# Patient Record
Sex: FEMALE | Race: WHITE | NOT HISPANIC OR LATINO
[De-identification: names, ages, dates, MRNs, and addresses within clinical notes are randomized per-mention and may not be internally consistent; named-entity substitution may affect disease eponyms.]

---

## 2018-01-05 ENCOUNTER — APPOINTMENT (OUTPATIENT)
Dept: GASTROENTEROLOGY | Facility: CLINIC | Age: 58
End: 2018-01-05
Payer: COMMERCIAL

## 2018-01-05 VITALS
RESPIRATION RATE: 12 BRPM | HEART RATE: 73 BPM | HEIGHT: 59 IN | WEIGHT: 116 LBS | DIASTOLIC BLOOD PRESSURE: 64 MMHG | OXYGEN SATURATION: 99 % | BODY MASS INDEX: 23.39 KG/M2 | TEMPERATURE: 97.9 F | SYSTOLIC BLOOD PRESSURE: 102 MMHG

## 2018-01-05 PROCEDURE — 36415 COLL VENOUS BLD VENIPUNCTURE: CPT

## 2018-01-05 PROCEDURE — 99214 OFFICE O/P EST MOD 30 MIN: CPT | Mod: 25

## 2018-01-05 RX ORDER — OMEPRAZOLE 40 MG/1
40 CAPSULE, DELAYED RELEASE ORAL
Qty: 30 | Refills: 0 | Status: ACTIVE | COMMUNITY
Start: 2017-12-28

## 2018-01-07 LAB
ALBUMIN SERPL ELPH-MCNC: 4.2 G/DL
ALP BLD-CCNC: 119 U/L
ALT SERPL-CCNC: 23 U/L
AMYLASE/CREAT SERPL: 103 U/L
APPEARANCE: CLEAR
AST SERPL-CCNC: 27 U/L
BASOPHILS # BLD AUTO: 0.02 K/UL
BASOPHILS NFR BLD AUTO: 0.4 %
BILIRUB DIRECT SERPL-MCNC: 0.1 MG/DL
BILIRUB INDIRECT SERPL-MCNC: 0.1 MG/DL
BILIRUB SERPL-MCNC: 0.2 MG/DL
BILIRUBIN URINE: NEGATIVE
BLOOD URINE: NEGATIVE
COLOR: YELLOW
CREAT SERPL-MCNC: 0.87 MG/DL
EOSINOPHIL # BLD AUTO: 0.04 K/UL
EOSINOPHIL NFR BLD AUTO: 0.9 %
GLUCOSE QUALITATIVE U: NEGATIVE MG/DL
HCT VFR BLD CALC: 39.1 %
HGB BLD-MCNC: 13.3 G/DL
IMM GRANULOCYTES NFR BLD AUTO: 0.2 %
KETONES URINE: NEGATIVE
LEUKOCYTE ESTERASE URINE: NEGATIVE
LPL SERPL-CCNC: 40 U/L
LYMPHOCYTES # BLD AUTO: 1.73 K/UL
LYMPHOCYTES NFR BLD AUTO: 38.4 %
MAN DIFF?: NORMAL
MCHC RBC-ENTMCNC: 30 PG
MCHC RBC-ENTMCNC: 34 GM/DL
MCV RBC AUTO: 88.3 FL
MONOCYTES # BLD AUTO: 0.39 K/UL
MONOCYTES NFR BLD AUTO: 8.6 %
NEUTROPHILS # BLD AUTO: 2.32 K/UL
NEUTROPHILS NFR BLD AUTO: 51.5 %
NITRITE URINE: NEGATIVE
PH URINE: 5.5
PLATELET # BLD AUTO: 224 K/UL
PROT SERPL-MCNC: 7 G/DL
PROTEIN URINE: NEGATIVE MG/DL
RBC # BLD: 4.43 M/UL
RBC # FLD: 12 %
SPECIFIC GRAVITY URINE: 1.01
UROBILINOGEN URINE: NEGATIVE MG/DL
WBC # FLD AUTO: 4.51 K/UL

## 2018-01-24 ENCOUNTER — MESSAGE (OUTPATIENT)
Age: 58
End: 2018-01-24

## 2018-02-04 ENCOUNTER — OTHER (OUTPATIENT)
Age: 58
End: 2018-02-04

## 2018-02-04 RX ORDER — OMEPRAZOLE 20 MG/1
20 CAPSULE, DELAYED RELEASE ORAL
Qty: 30 | Refills: 2 | Status: ACTIVE | COMMUNITY
Start: 2018-02-04 | End: 1900-01-01

## 2018-08-02 ENCOUNTER — OUTPATIENT (OUTPATIENT)
Dept: OUTPATIENT SERVICES | Facility: HOSPITAL | Age: 58
LOS: 1 days | End: 2018-08-02
Payer: COMMERCIAL

## 2018-08-02 ENCOUNTER — APPOINTMENT (OUTPATIENT)
Dept: ULTRASOUND IMAGING | Facility: IMAGING CENTER | Age: 58
End: 2018-08-02
Payer: COMMERCIAL

## 2018-08-02 DIAGNOSIS — R10.11 RIGHT UPPER QUADRANT PAIN: ICD-10-CM

## 2018-08-02 PROCEDURE — 76536 US EXAM OF HEAD AND NECK: CPT | Mod: 26

## 2018-08-02 PROCEDURE — 76536 US EXAM OF HEAD AND NECK: CPT

## 2018-09-17 ENCOUNTER — APPOINTMENT (OUTPATIENT)
Dept: ORTHOPEDIC SURGERY | Facility: CLINIC | Age: 58
End: 2018-09-17
Payer: COMMERCIAL

## 2018-09-17 VITALS — HEIGHT: 60 IN | WEIGHT: 113 LBS | BODY MASS INDEX: 22.19 KG/M2

## 2018-09-17 VITALS
HEIGHT: 59 IN | HEART RATE: 74 BPM | SYSTOLIC BLOOD PRESSURE: 102 MMHG | WEIGHT: 116 LBS | DIASTOLIC BLOOD PRESSURE: 61 MMHG | BODY MASS INDEX: 23.39 KG/M2

## 2018-09-17 DIAGNOSIS — M50.30 OTHER CERVICAL DISC DEGENERATION, UNSPECIFIED CERVICAL REGION: ICD-10-CM

## 2018-09-17 DIAGNOSIS — M54.12 RADICULOPATHY, CERVICAL REGION: ICD-10-CM

## 2018-09-17 DIAGNOSIS — M81.0 AGE-RELATED OSTEOPOROSIS W/OUT CURRENT PATHOLOGICAL FRACTURE: ICD-10-CM

## 2018-09-17 DIAGNOSIS — R12 HEARTBURN: ICD-10-CM

## 2018-09-17 DIAGNOSIS — M54.2 CERVICALGIA: ICD-10-CM

## 2018-09-17 DIAGNOSIS — M47.22 OTHER SPONDYLOSIS WITH RADICULOPATHY, CERVICAL REGION: ICD-10-CM

## 2018-09-17 PROCEDURE — 99204 OFFICE O/P NEW MOD 45 MIN: CPT

## 2018-09-17 RX ORDER — RALOXIFENE HYDROCHLORIDE 60 MG/1
TABLET ORAL
Refills: 0 | Status: ACTIVE | COMMUNITY

## 2018-09-17 RX ORDER — NAPROXEN 500 MG/1
500 TABLET ORAL
Qty: 60 | Refills: 0 | Status: ACTIVE | COMMUNITY
Start: 2018-09-17 | End: 1900-01-01

## 2018-10-17 ENCOUNTER — APPOINTMENT (OUTPATIENT)
Dept: ORTHOPEDIC SURGERY | Facility: CLINIC | Age: 58
End: 2018-10-17

## 2019-04-24 ENCOUNTER — APPOINTMENT (OUTPATIENT)
Dept: OPHTHALMOLOGY | Facility: CLINIC | Age: 59
End: 2019-04-24
Payer: COMMERCIAL

## 2019-04-24 DIAGNOSIS — H57.11 OCULAR PAIN, RIGHT EYE: ICD-10-CM

## 2019-04-24 PROCEDURE — 92004 COMPRE OPH EXAM NEW PT 1/>: CPT

## 2019-04-25 ENCOUNTER — APPOINTMENT (OUTPATIENT)
Dept: OPHTHALMOLOGY | Facility: CLINIC | Age: 59
End: 2019-04-25
Payer: COMMERCIAL

## 2019-04-25 PROBLEM — H57.11 PAIN AROUND RIGHT EYE: Status: ACTIVE | Noted: 2019-04-25

## 2019-04-25 PROCEDURE — 92012 INTRM OPH EXAM EST PATIENT: CPT

## 2019-06-07 ENCOUNTER — APPOINTMENT (OUTPATIENT)
Dept: GASTROENTEROLOGY | Facility: CLINIC | Age: 59
End: 2019-06-07
Payer: COMMERCIAL

## 2019-06-07 VITALS
BODY MASS INDEX: 22.19 KG/M2 | OXYGEN SATURATION: 99 % | DIASTOLIC BLOOD PRESSURE: 60 MMHG | SYSTOLIC BLOOD PRESSURE: 100 MMHG | RESPIRATION RATE: 14 BRPM | HEART RATE: 69 BPM | HEIGHT: 60 IN | TEMPERATURE: 98.1 F | WEIGHT: 113 LBS

## 2019-06-07 DIAGNOSIS — R10.9 UNSPECIFIED ABDOMINAL PAIN: ICD-10-CM

## 2019-06-07 PROCEDURE — 99214 OFFICE O/P EST MOD 30 MIN: CPT

## 2019-06-07 RX ORDER — HYOSCYAMINE SULFATE 0.12 MG/1
0.12 TABLET SUBLINGUAL 4 TIMES DAILY
Qty: 30 | Refills: 1 | Status: ACTIVE | COMMUNITY
Start: 2019-06-07 | End: 1900-01-01

## 2019-06-07 NOTE — ASSESSMENT
[FreeTextEntry1] : Impression:\par \par #1 abdominal pain-primarily right upper quadrant pain. Exacerbated by food intake. Similar to previously reported pain  at which time routine labs, CT scan and EGD were nondiagnostic. Doubt peptic ulcer disease. No response to previous PPI therapy. Dominant location it right costal margin from suspicion of musculoskeletal etiology. However, not clear why symptoms are exacerbated postprandial period\par \par #2 iregular bowel movements-very sensitive with intermittent loose stools.  Lactose intolerance as well as other possible dietary sensitivity suspected.\par \par #3 colon cancer screening-reports normal colonoscopy 2013. Average risk.\par \par #4 status post cholecystectomy.\par \par #5 hepatic hemangioma.\par \par #6 alopecia.\par

## 2019-06-07 NOTE — HISTORY OF PRESENT ILLNESS
[FreeTextEntry1] : Office revisit on 6/7/19.\par \par Patient presents for GI followup-continued complaints of right upper quadrant abdominal pain. Irregular bowel movements.\par \par INITIAL CONSULTATION NOTE:\par \par Office revisit on 6/24/15. The patient is a 54-year-old woman with a complaint of right upper quadrant pain. The patient was recently evaluated by Dr. Afshin Del Valle. The patient requests followup by me as her  and other family members are my patients.\par \par The patient has multiple somatic complaints. Salient history:\par \par -Constant pain localized to right costal margin region x 3months. Described as a dull, heaviness and pressure discomfort but also can be sharp. Severity increased postprandial. Feels pain is worse immediately upon eating and implicates no particular foods are provocative. At times the pain can radiate around the flank to her back or occasionally to the right side of the abdomen.\par \par -Complaints of heartburn. Typical retrosternal burning. Recently started on pantoprazole 40 mg daily with improvement in heartburn. Right upper quadrant discomfort is no better.\par \par -Burning discomfort of tongue. No better on pantoprazole.\par \par -Dysuria of several weeks duration.\par \par -Complains of insomnia. Difficulty sleeping because of discomfort.\par \par -Complains of dizziness.\par \par Patient relates onset of various current symptoms to a dental implant was inserted approximately 3 months ago. Was on antibiotics for approximately one month at that time.\par \par Stable appetite. Has lost 4 pounds. Denies dysphagia, regurgitation, nausea or vomiting. Previously constipated. Now has regular daily bowel movements without complaints of diarrhea or any rectal bleeding.\par \par A CT scan and abdominal ultrasound was reported as nondiagnostic. Patient was told of hepatic steatosis.\par \par She underwent a laparoscopic cholecystectomy approximately 15 years ago.\par \par EGD 8/10/15:    -The patient underwent an upper endoscopy on 8/10/15. The esophagus was normal. Examination of the stomach revealed prepyloric antral erythema. The gastric examination was otherwise normal. The visualized duodenum to the second portion was normal. Duodenal biopsies were normal without any features of celiac disease. Gastric biopsy revealed chronic inactive gastritis and reactive gastropathy. Testing was negative for Helicobacter pylori. \par \par ORV 1/5/18;    -Patient presents with complaint of epigastric and right upper quadrant pain. Current complaint similar to previous symptoms reported in 2015 but patient indicates pain is more severe at this time. Previous symptoms in 2015 were of unexplained etiology and ultimately resolved. However, patient indicates unexplained recurrence one month ago with increased severity over the past 2 weeks. No precipitating factors over this past month are reported such as diet change, new medications, travel etc. Has been under increased stress.\par             -Now experiencing a constant epigastric and right upper quadrant pain that increases in severity in association with eating. Patient indicates that increased severity of pain is almost immediate upon beginning the meal and that no particular food is provocative. Describes a sharp pain at the epigastric, right upper quadrant as well as right flank and back region in association with eating. The more intense pain will subside but there is a persistent constant dull pain at the site rated at 4/10. No relationship to exertion, lifting etc. Palliative factors not reported. The patient has been on omeprazole 40 mg b.i.d. without improvement. No associated rash.\par                 -Denies fever. Appetite fair. No significant weight change but may have lost one or 2 pounds. Denies dysphagia, nausea or vomiting. Can have occasional heartburn. Typically has a bowel movement every other day without complaints of diarrhea, constipation or rectal bleeding.\par                 -Patient had recent routine labs including CBC, liver enzymes and amylase which were all normal except for mildly elevated amylase.\par                  -Patient complains of burning sensation in her mouth as well as vagina.\par \par CT SCAN ABDOMEN 2/1/18:    -Review of the 2/1/18 CT scan of the abdomen with the patient. Examination of the liver was noted for an 11 mm enhancing nodule in the dome of the right hepatic lobe. Of note, an identical finding was detected at a previous CAT scan on 5/5/15 at which time an 11 mm enhancing right hepatic nodule was noted consistent with a hemangioma. Stable findings. CT scan otherwise normal including normal pancreas, kidneys and visualized bowel. No significant findings noted.\par \par ORV 6/7/19:    -Experiencing intermittent right upper quadrant abdominal pain. Mostly right upper quadrant and especially right costal margin region. Described as intermittent nonradiating sharp pain lasting one hour. Usually occurs post prandial with episodes 2-3 times per week. Feels dairy products exacerbate symptoms. Patient is status post cholecystectomy.\par                       -Experiencing a regular bowel movements for several months.. Indicates diarrhea after dairy products. Typically having 2-3 bowel movements daily which vary in consistency. Describes soft bowel movements or at times passage of "small pieces". No rectal bleeding. Previously was having formed bowel movement every other day. Precipitating factors at onset such as travel, diet change etc. not reported.\par                      -Appetite stable. Indicates gradual 7 pound weight decrease. Denies dysphagia. Only infrequently experiences heartburn. Denies nausea or vomiting.\par

## 2019-06-07 NOTE — CONSULT LETTER
[Please see my note below.] : Please see my note below. [Dear  ___] : Dear  [unfilled], [Sincerely,] : Sincerely, [Consult Closing:] : Thank you very much for allowing me to participate in the care of this patient.  If you have any questions, please do not hesitate to contact me. [Husam Banda M.D.] : Husam Banda M.D. [Division of Gastroenterology] : Division of Gastroenterology [[address] : [address] [[facility]] : [facility] [[place] [address]] : [place] [address] [FreeTextEntry3] : Husam Banda M.D. [FreeTextEntry2] : Dr. Esperanza Walls [Consult Letter:] : I had the pleasure of evaluating your patient, [unfilled].

## 2019-06-07 NOTE — PHYSICAL EXAM
[General Appearance - Alert] : alert [General Appearance - In No Acute Distress] : in no acute distress [General Appearance - Well Nourished] : well nourished [General Appearance - Well-Appearing] : healthy appearing [General Appearance - Well Developed] : well developed [Sclera] : the sclera and conjunctiva were normal [Neck Appearance] : the appearance of the neck was normal [Oropharynx] : the oropharynx was normal [Neck Cervical Mass (___cm)] : no neck mass was observed [Respiration, Rhythm And Depth] : normal respiratory rhythm and effort [Auscultation Breath Sounds / Voice Sounds] : lungs were clear to auscultation bilaterally [Exaggerated Use Of Accessory Muscles For Inspiration] : no accessory muscle use [Heart Rate And Rhythm] : heart rate was normal and rhythm regular [Heart Sounds Gallop] : no gallops [Murmurs] : no murmurs [Heart Sounds] : normal S1 and S2 [Edema] : there was no peripheral edema [Heart Sounds Pericardial Friction Rub] : no pericardial rub [Bowel Sounds] : normal bowel sounds [Abdomen Soft] : soft [] : no hepato-splenomegaly [Abdomen Hernia] : no hernia was discovered [Abdomen Mass (___ Cm)] : no abdominal mass palpated [Supraclavicular Lymph Nodes Enlarged Bilaterally] : supraclavicular [Cervical Lymph Nodes Enlarged Anterior Bilaterally] : anterior cervical [Cervical Lymph Nodes Enlarged Posterior Bilaterally] : posterior cervical [No CVA Tenderness] : no ~M costovertebral angle tenderness [Nail Clubbing] : no clubbing  or cyanosis of the fingernails [Abnormal Walk] : normal gait [Skin Color & Pigmentation] : normal skin color and pigmentation [Oriented To Time, Place, And Person] : oriented to person, place, and time [Affect] : the affect was normal [Mood] : the mood was normal [Impaired Insight] : insight and judgment were intact [FreeTextEntry1] : S1-S2 regular.

## 2019-08-27 ENCOUNTER — APPOINTMENT (OUTPATIENT)
Dept: GASTROENTEROLOGY | Facility: CLINIC | Age: 59
End: 2019-08-27

## 2021-01-26 ENCOUNTER — NON-APPOINTMENT (OUTPATIENT)
Age: 61
End: 2021-01-26

## 2021-01-26 ENCOUNTER — APPOINTMENT (OUTPATIENT)
Dept: OPHTHALMOLOGY | Facility: CLINIC | Age: 61
End: 2021-01-26
Payer: COMMERCIAL

## 2021-01-26 PROCEDURE — 92020 GONIOSCOPY: CPT

## 2021-01-26 PROCEDURE — 99072 ADDL SUPL MATRL&STAF TM PHE: CPT

## 2021-01-26 PROCEDURE — 92014 COMPRE OPH EXAM EST PT 1/>: CPT

## 2021-01-26 PROCEDURE — 92134 CPTRZ OPH DX IMG PST SGM RTA: CPT

## 2021-11-05 ENCOUNTER — TRANSCRIPTION ENCOUNTER (OUTPATIENT)
Age: 61
End: 2021-11-05

## 2022-02-23 ENCOUNTER — APPOINTMENT (OUTPATIENT)
Dept: GASTROENTEROLOGY | Facility: CLINIC | Age: 62
End: 2022-02-23
Payer: COMMERCIAL

## 2022-02-23 VITALS
DIASTOLIC BLOOD PRESSURE: 80 MMHG | HEART RATE: 82 BPM | TEMPERATURE: 98 F | SYSTOLIC BLOOD PRESSURE: 120 MMHG | WEIGHT: 117 LBS | HEIGHT: 60 IN | BODY MASS INDEX: 22.97 KG/M2 | OXYGEN SATURATION: 98 %

## 2022-02-23 DIAGNOSIS — R10.11 RIGHT UPPER QUADRANT PAIN: ICD-10-CM

## 2022-02-23 PROCEDURE — 99214 OFFICE O/P EST MOD 30 MIN: CPT

## 2022-02-23 RX ORDER — SODIUM PICOSULFATE, MAGNESIUM OXIDE, AND ANHYDROUS CITRIC ACID 10; 3.5; 12 MG/160ML; G/160ML; G/160ML
10-3.5-12 MG-GM LIQUID ORAL
Qty: 320 | Refills: 0 | Status: ACTIVE | COMMUNITY
Start: 2022-02-23 | End: 1900-01-01

## 2022-02-23 NOTE — CONSULT LETTER
[Dear  ___] : Dear  [unfilled], [Consult Letter:] : I had the pleasure of evaluating your patient, [unfilled]. [Please see my note below.] : Please see my note below. [Consult Closing:] : Thank you very much for allowing me to participate in the care of this patient.  If you have any questions, please do not hesitate to contact me. [Sincerely,] : Sincerely, [FreeTextEntry2] : Dr. Esperanza Walls [FreeTextEntry3] : Husam Banda M.D.

## 2022-02-23 NOTE — ASSESSMENT
[FreeTextEntry1] : Impression:\par \par #1 colon cancer screening–rule out colonic neoplasm.  Patient average risk.  Currently stable from GI standpoint without report of lower GI symptoms at current time.  Reports normal colonoscopy 2013.\par \par #2 episodic abdominal pain-previously reported right upper quadrant pain as well as epigastric pain.  Last episode 11/2021–resolved with omeprazole.  No current abdominal pain and not requiring antisecretory therapy at current time.  Had previous nondiagnostic CT scan and upper endoscopy.\par \par #3 lactose intolerance–dairy sensitive.  Occasional loose stools after dairy products..\par \par #4 status post cholecystectomy.\par \par #5 hepatic hemangioma.\par \par #6 history of alopecia.\par

## 2022-02-23 NOTE — PHYSICAL EXAM
[General Appearance - Alert] : alert [General Appearance - In No Acute Distress] : in no acute distress [General Appearance - Well Nourished] : well nourished [General Appearance - Well Developed] : well developed [General Appearance - Well-Appearing] : healthy appearing [Sclera] : the sclera and conjunctiva were normal [Neck Appearance] : the appearance of the neck was normal [Neck Cervical Mass (___cm)] : no neck mass was observed [Respiration, Rhythm And Depth] : normal respiratory rhythm and effort [Exaggerated Use Of Accessory Muscles For Inspiration] : no accessory muscle use [Auscultation Breath Sounds / Voice Sounds] : lungs were clear to auscultation bilaterally [Heart Rate And Rhythm] : heart rate was normal and rhythm regular [Heart Sounds] : normal S1 and S2 [Heart Sounds Gallop] : no gallops [Murmurs] : no murmurs [Heart Sounds Pericardial Friction Rub] : no pericardial rub [Edema] : there was no peripheral edema [Bowel Sounds] : normal bowel sounds [Abdomen Soft] : soft [Abdomen Tenderness] : non-tender [] : no hepato-splenomegaly [Abdomen Mass (___ Cm)] : no abdominal mass palpated [Abdomen Hernia] : no hernia was discovered [Cervical Lymph Nodes Enlarged Posterior Bilaterally] : posterior cervical [Cervical Lymph Nodes Enlarged Anterior Bilaterally] : anterior cervical [Supraclavicular Lymph Nodes Enlarged Bilaterally] : supraclavicular [No CVA Tenderness] : no ~M costovertebral angle tenderness [Abnormal Walk] : normal gait [Nail Clubbing] : no clubbing  or cyanosis of the fingernails [Skin Color & Pigmentation] : normal skin color and pigmentation [Oriented To Time, Place, And Person] : oriented to person, place, and time [Impaired Insight] : insight and judgment were intact [Affect] : the affect was normal [Mood] : the mood was normal [FreeTextEntry1] : S1-S2 regular.

## 2022-02-23 NOTE — HISTORY OF PRESENT ILLNESS
[FreeTextEntry1] : Office revisit on 2/23/2022.\par \par Patient presents for GI followup in preparation for screening colonoscopy.\par \par INITIAL CONSULTATION NOTE:\par \par Office revisit on 6/24/15. The patient is a 54-year-old woman with a complaint of right upper quadrant pain. The patient was recently evaluated by Dr. Afshin Del Valle. The patient requests followup by me as her  and other family members are my patients.\par \par The patient has multiple somatic complaints. Salient history:\par \par -Constant pain localized to right costal margin region x 3months. Described as a dull, heaviness and pressure discomfort but also can be sharp. Severity increased postprandial. Feels pain is worse immediately upon eating and implicates no particular foods are provocative. At times the pain can radiate around the flank to her back or occasionally to the right side of the abdomen.\par \par -Complaints of heartburn. Typical retrosternal burning. Recently started on pantoprazole 40 mg daily with improvement in heartburn. Right upper quadrant discomfort is no better.\par \par -Burning discomfort of tongue. No better on pantoprazole.\par \par -Dysuria of several weeks duration.\par \par -Complains of insomnia. Difficulty sleeping because of discomfort.\par \par -Complains of dizziness.\par \par Patient relates onset of various current symptoms to a dental implant was inserted approximately 3 months ago. Was on antibiotics for approximately one month at that time.\par \par Stable appetite. Has lost 4 pounds. Denies dysphagia, regurgitation, nausea or vomiting. Previously constipated. Now has regular daily bowel movements without complaints of diarrhea or any rectal bleeding.\par \par A CT scan and abdominal ultrasound was reported as nondiagnostic. Patient was told of hepatic steatosis.\par \par She underwent a laparoscopic cholecystectomy approximately 15 years ago.\par \par EGD 8/10/15:    -The patient underwent an upper endoscopy on 8/10/15. The esophagus was normal. Examination of the stomach revealed prepyloric antral erythema. The gastric examination was otherwise normal. The visualized duodenum to the second portion was normal. Duodenal biopsies were normal without any features of celiac disease. Gastric biopsy revealed chronic inactive gastritis and reactive gastropathy. Testing was negative for Helicobacter pylori. \par \par ORV 1/5/18;    -Patient presents with complaint of epigastric and right upper quadrant pain. Current complaint similar to previous symptoms reported in 2015 but patient indicates pain is more severe at this time. Previous symptoms in 2015 were of unexplained etiology and ultimately resolved. However, patient indicates unexplained recurrence one month ago with increased severity over the past 2 weeks. No precipitating factors over this past month are reported such as diet change, new medications, travel etc. Has been under increased stress.\par             -Now experiencing a constant epigastric and right upper quadrant pain that increases in severity in association with eating. Patient indicates that increased severity of pain is almost immediate upon beginning the meal and that no particular food is provocative. Describes a sharp pain at the epigastric, right upper quadrant as well as right flank and back region in association with eating. The more intense pain will subside but there is a persistent constant dull pain at the site rated at 4/10. No relationship to exertion, lifting etc. Palliative factors not reported. The patient has been on omeprazole 40 mg b.i.d. without improvement. No associated rash.\par                 -Denies fever. Appetite fair. No significant weight change but may have lost one or 2 pounds. Denies dysphagia, nausea or vomiting. Can have occasional heartburn. Typically has a bowel movement every other day without complaints of diarrhea, constipation or rectal bleeding.\par                 -Patient had recent routine labs including CBC, liver enzymes and amylase which were all normal except for mildly elevated amylase.\par                  -Patient complains of burning sensation in her mouth as well as vagina.\par \par CT SCAN ABDOMEN 2/1/18:    -Review of the 2/1/18 CT scan of the abdomen with the patient. Examination of the liver was noted for an 11 mm enhancing nodule in the dome of the right hepatic lobe. Of note, an identical finding was detected at a previous CAT scan on 5/5/15 at which time an 11 mm enhancing right hepatic nodule was noted consistent with a hemangioma. Stable findings. CT scan otherwise normal including normal pancreas, kidneys and visualized bowel. No significant findings noted.\par \par ORV 6/7/19:    -Experiencing intermittent right upper quadrant abdominal pain. Mostly right upper quadrant and especially right costal margin region. Described as intermittent nonradiating sharp pain lasting one hour. Usually occurs post prandial with episodes 2-3 times per week. Feels dairy products exacerbate symptoms. Patient is status post cholecystectomy.\par                       -Experiencing a regular bowel movements for several months.. Indicates diarrhea after dairy products. Typically having 2-3 bowel movements daily which vary in consistency. Describes soft bowel movements or at times passage of "small pieces". No rectal bleeding. Previously was having formed bowel movement every other day. Precipitating factors at onset such as travel, diet change etc. not reported.\par                      -Appetite stable. Indicates gradual 7 pound weight decrease. Denies dysphagia. Only infrequently experiences heartburn. Denies nausea or vomiting.\par \par ORV 2/23/2022:     -Presents for follow-up in preparation for screening colonoscopy.  Overall, doing well from GI standpoint.  Somewhat irregular bowel movements but stable.  Typically has formed [place] 3 or [place] 4 bowel movements every other day.  Occasional loose stools reported and describes her self as being dairy sensitive.  Denies change in bowel habit or rectal bleeding.  Denies fever.  Appetite and weight are stable reports no current complaints of dysphagia, nausea, vomiting or abdominal pain.\par                               -In 10/2021 had experienced epigastric pain.  Indicates PMD submitted routine labs which were normal (will request).  Patient is status post laparoscopic cholecystectomy.  Treated with omeprazole at that time with resolution of symptoms.  No current complaint of abdominal pain.\par

## 2022-03-11 ENCOUNTER — EMERGENCY (EMERGENCY)
Facility: HOSPITAL | Age: 62
LOS: 1 days | Discharge: HOME | End: 2022-03-11
Attending: EMERGENCY MEDICINE
Payer: COMMERCIAL

## 2022-03-11 VITALS
DIASTOLIC BLOOD PRESSURE: 114 MMHG | RESPIRATION RATE: 20 BRPM | TEMPERATURE: 98 F | SYSTOLIC BLOOD PRESSURE: 187 MMHG | OXYGEN SATURATION: 100 % | HEART RATE: 88 BPM | WEIGHT: 116.18 LBS

## 2022-03-11 DIAGNOSIS — Z90.49 ACQUIRED ABSENCE OF OTHER SPECIFIED PARTS OF DIGESTIVE TRACT: Chronic | ICD-10-CM

## 2022-03-11 LAB
ALBUMIN SERPL ELPH-MCNC: 4.5 G/DL — SIGNIFICANT CHANGE UP (ref 3.3–5)
ALP SERPL-CCNC: 125 U/L — HIGH (ref 40–120)
ALT FLD-CCNC: 17 U/L — SIGNIFICANT CHANGE UP (ref 10–45)
AMPHET UR-MCNC: NEGATIVE — SIGNIFICANT CHANGE UP
ANION GAP SERPL CALC-SCNC: 15 MMOL/L — SIGNIFICANT CHANGE UP (ref 5–17)
APTT BLD: 30.5 SEC — SIGNIFICANT CHANGE UP (ref 27.5–35.5)
AST SERPL-CCNC: 22 U/L — SIGNIFICANT CHANGE UP (ref 10–40)
BARBITURATES UR SCN-MCNC: NEGATIVE — SIGNIFICANT CHANGE UP
BASE EXCESS BLDV CALC-SCNC: -1.5 MMOL/L — SIGNIFICANT CHANGE UP (ref -2–2)
BASOPHILS # BLD AUTO: 0.03 K/UL — SIGNIFICANT CHANGE UP (ref 0–0.2)
BASOPHILS NFR BLD AUTO: 0.5 % — SIGNIFICANT CHANGE UP (ref 0–2)
BENZODIAZ UR-MCNC: NEGATIVE — SIGNIFICANT CHANGE UP
BILIRUB SERPL-MCNC: 0.3 MG/DL — SIGNIFICANT CHANGE UP (ref 0.2–1.2)
BLOOD GAS VENOUS - CREATININE: SIGNIFICANT CHANGE UP MG/DL (ref 0.5–1.3)
BUN SERPL-MCNC: 17 MG/DL — SIGNIFICANT CHANGE UP (ref 7–23)
CA-I SERPL-SCNC: 1.21 MMOL/L — SIGNIFICANT CHANGE UP (ref 1.15–1.33)
CALCIUM SERPL-MCNC: 10 MG/DL — SIGNIFICANT CHANGE UP (ref 8.4–10.5)
CHLORIDE BLDV-SCNC: 101 MMOL/L — SIGNIFICANT CHANGE UP (ref 96–108)
CHLORIDE SERPL-SCNC: 103 MMOL/L — SIGNIFICANT CHANGE UP (ref 96–108)
CO2 BLDV-SCNC: 20 MMOL/L — LOW (ref 22–26)
CO2 SERPL-SCNC: 18 MMOL/L — LOW (ref 22–31)
COCAINE METAB.OTHER UR-MCNC: NEGATIVE — SIGNIFICANT CHANGE UP
CREAT SERPL-MCNC: 0.77 MG/DL — SIGNIFICANT CHANGE UP (ref 0.5–1.3)
EGFR: 88 ML/MIN/1.73M2 — SIGNIFICANT CHANGE UP
EOSINOPHIL # BLD AUTO: 0.06 K/UL — SIGNIFICANT CHANGE UP (ref 0–0.5)
EOSINOPHIL NFR BLD AUTO: 0.9 % — SIGNIFICANT CHANGE UP (ref 0–6)
GAS PNL BLDV: 134 MMOL/L — LOW (ref 136–145)
GAS PNL BLDV: SIGNIFICANT CHANGE UP
GAS PNL BLDV: SIGNIFICANT CHANGE UP
GLUCOSE BLDV-MCNC: 96 MG/DL — SIGNIFICANT CHANGE UP (ref 70–99)
GLUCOSE SERPL-MCNC: 104 MG/DL — HIGH (ref 70–99)
HCO3 BLDV-SCNC: 20 MMOL/L — LOW (ref 22–29)
HCT VFR BLD CALC: 32.8 % — LOW (ref 34.5–45)
HCT VFR BLDA CALC: 43 % — SIGNIFICANT CHANGE UP (ref 34.5–46.5)
HGB BLD CALC-MCNC: 14.2 G/DL — SIGNIFICANT CHANGE UP (ref 11.7–16.1)
HGB BLD-MCNC: 11.4 G/DL — LOW (ref 11.5–15.5)
IMM GRANULOCYTES NFR BLD AUTO: 0.2 % — SIGNIFICANT CHANGE UP (ref 0–1.5)
INR BLD: 1.01 RATIO — SIGNIFICANT CHANGE UP (ref 0.88–1.16)
LACTATE BLDV-MCNC: 1.5 MMOL/L — SIGNIFICANT CHANGE UP (ref 0.7–2)
LYMPHOCYTES # BLD AUTO: 2.35 K/UL — SIGNIFICANT CHANGE UP (ref 1–3.3)
LYMPHOCYTES # BLD AUTO: 36.5 % — SIGNIFICANT CHANGE UP (ref 13–44)
MCHC RBC-ENTMCNC: 30.3 PG — SIGNIFICANT CHANGE UP (ref 27–34)
MCHC RBC-ENTMCNC: 34.8 GM/DL — SIGNIFICANT CHANGE UP (ref 32–36)
MCV RBC AUTO: 87.2 FL — SIGNIFICANT CHANGE UP (ref 80–100)
METHADONE UR-MCNC: NEGATIVE — SIGNIFICANT CHANGE UP
MONOCYTES # BLD AUTO: 0.52 K/UL — SIGNIFICANT CHANGE UP (ref 0–0.9)
MONOCYTES NFR BLD AUTO: 8.1 % — SIGNIFICANT CHANGE UP (ref 2–14)
NEUTROPHILS # BLD AUTO: 3.47 K/UL — SIGNIFICANT CHANGE UP (ref 1.8–7.4)
NEUTROPHILS NFR BLD AUTO: 53.8 % — SIGNIFICANT CHANGE UP (ref 43–77)
NRBC # BLD: 0 /100 WBCS — SIGNIFICANT CHANGE UP (ref 0–0)
OPIATES UR-MCNC: NEGATIVE — SIGNIFICANT CHANGE UP
OXYCODONE UR-MCNC: NEGATIVE — SIGNIFICANT CHANGE UP
PCO2 BLDV: 24 MMHG — LOW (ref 39–42)
PCP SPEC-MCNC: SIGNIFICANT CHANGE UP
PCP UR-MCNC: NEGATIVE — SIGNIFICANT CHANGE UP
PH BLDV: 7.52 — HIGH (ref 7.32–7.43)
PLATELET # BLD AUTO: 197 K/UL — SIGNIFICANT CHANGE UP (ref 150–400)
PO2 BLDV: 20 MMHG — LOW (ref 25–45)
POTASSIUM BLDV-SCNC: 3.4 MMOL/L — LOW (ref 3.5–5.1)
POTASSIUM SERPL-MCNC: 3.7 MMOL/L — SIGNIFICANT CHANGE UP (ref 3.5–5.3)
POTASSIUM SERPL-SCNC: 3.7 MMOL/L — SIGNIFICANT CHANGE UP (ref 3.5–5.3)
PROT SERPL-MCNC: 7.2 G/DL — SIGNIFICANT CHANGE UP (ref 6–8.3)
PROTHROM AB SERPL-ACNC: 11.7 SEC — SIGNIFICANT CHANGE UP (ref 10.5–13.4)
RBC # BLD: 3.76 M/UL — LOW (ref 3.8–5.2)
RBC # FLD: 11.5 % — SIGNIFICANT CHANGE UP (ref 10.3–14.5)
SAO2 % BLDV: 34.8 % — LOW (ref 67–88)
SODIUM SERPL-SCNC: 136 MMOL/L — SIGNIFICANT CHANGE UP (ref 135–145)
THC UR QL: NEGATIVE — SIGNIFICANT CHANGE UP
TROPONIN T, HIGH SENSITIVITY RESULT: <6 NG/L — SIGNIFICANT CHANGE UP (ref 0–51)
WBC # BLD: 6.44 K/UL — SIGNIFICANT CHANGE UP (ref 3.8–10.5)
WBC # FLD AUTO: 6.44 K/UL — SIGNIFICANT CHANGE UP (ref 3.8–10.5)

## 2022-03-11 PROCEDURE — 99220: CPT

## 2022-03-11 PROCEDURE — 99203 OFFICE O/P NEW LOW 30 MIN: CPT

## 2022-03-11 PROCEDURE — 70498 CT ANGIOGRAPHY NECK: CPT | Mod: 26,MA

## 2022-03-11 PROCEDURE — 70496 CT ANGIOGRAPHY HEAD: CPT | Mod: 26,MA

## 2022-03-11 RX ORDER — ATORVASTATIN CALCIUM 80 MG/1
80 TABLET, FILM COATED ORAL AT BEDTIME
Refills: 0 | Status: DISCONTINUED | OUTPATIENT
Start: 2022-03-11 | End: 2022-03-15

## 2022-03-11 RX ORDER — ACETAMINOPHEN 500 MG
325 TABLET ORAL ONCE
Refills: 0 | Status: COMPLETED | OUTPATIENT
Start: 2022-03-11 | End: 2022-03-11

## 2022-03-11 RX ORDER — ACETAMINOPHEN 500 MG
975 TABLET ORAL ONCE
Refills: 0 | Status: DISCONTINUED | OUTPATIENT
Start: 2022-03-11 | End: 2022-03-11

## 2022-03-11 RX ORDER — ASPIRIN/CALCIUM CARB/MAGNESIUM 324 MG
81 TABLET ORAL DAILY
Refills: 0 | Status: DISCONTINUED | OUTPATIENT
Start: 2022-03-11 | End: 2022-03-11

## 2022-03-11 RX ORDER — CLOPIDOGREL BISULFATE 75 MG/1
75 TABLET, FILM COATED ORAL ONCE
Refills: 0 | Status: COMPLETED | OUTPATIENT
Start: 2022-03-11 | End: 2022-03-11

## 2022-03-11 RX ADMIN — Medication 81 MILLIGRAM(S): at 21:26

## 2022-03-11 RX ADMIN — ATORVASTATIN CALCIUM 80 MILLIGRAM(S): 80 TABLET, FILM COATED ORAL at 21:31

## 2022-03-11 RX ADMIN — CLOPIDOGREL BISULFATE 75 MILLIGRAM(S): 75 TABLET, FILM COATED ORAL at 21:27

## 2022-03-11 NOTE — ED ADULT NURSE NOTE - CHIEF COMPLAINT QUOTE
dizziness starting 6pm tonight, pt endorsing she had one episode of inability to say words properly at 6pm. speech clear in triage.

## 2022-03-11 NOTE — ED CDU PROVIDER INITIAL DAY NOTE - OBJECTIVE STATEMENT
61 year old F with reported PMH of heart murmur, alopecia, osteoporosis, anxiety presenting with change in speech and R facial droop. States that at 6pm tonight she felt very fatigued, with an intermittent headache, dizziness, and lightheadedness. Endorsed pain to her L lower leg that lasted a few seconds. States that she felt numbness to R side of lip with facial droop. She noticed that she was mixing up words and using different words than she wanted to say. Denies CP, SOB, palpitations, syncope, history of PE or DVT. Of note, states that she was on Aspirin 6-7 years ago for her heart murmur but stopped the medication after noticing bruising on her body.    At this time, endorses fatigue with headache. 61 year old F with reported PMH of heart murmur, alopecia, osteoporosis, anxiety presenting with change in speech and R facial droop. States that at 6pm tonight she felt very fatigued, with an intermittent headache, dizziness, and lightheadedness. Endorsed pain to her L lower leg that lasted a few seconds. States that she felt numbness to R side of lip with facial droop. She noticed that she was mixing up words and using different words than she wanted to say. Denies CP, SOB, palpitations, syncope, history of PE or DVT.  Of note, states that she was on Aspirin 6-7 years ago for her heart murmur but stopped the medication after noticing bruising on her body.  Denies history of GI bleed.   ED course: CODE STROKE. CTs negative for acute pathology. At time of CDU evaluation, endorses fatigue, nausea, with headache. Neurology recommended Plavix, Asprin, and Atorvastatin. Plan for MRI and echo in CDU.

## 2022-03-11 NOTE — ED PROVIDER NOTE - OBJECTIVE STATEMENT
61 year old F with reported PMH or heart murmur presenting with word finding difficulty, R facial droop, lightheadedness. Patient was last known normal around 6pm. Said she was on the phone and was mixing up words. Wanted to say "oil" and she said "chicken bone." Noticed she had a slight R facial droop and was felling lightheaded. Denies CP, SOB, palpitations, syncope, history of PE or DVT. Code stroke called, NIH score of 1. No prior neurological history.

## 2022-03-11 NOTE — CONSULT NOTE ADULT - SUBJECTIVE AND OBJECTIVE BOX
HPI:    (Stroke only)  LKN:  NIHSS:   preMRS:   Pt is not a candidate for tpa due to [outside tpa window / mild, non-disabling deficit]  Pt is not a candidate for mechanical thrombectomy due to no large vessel occlusion on CTA    REVIEW OF SYSTEMS  General:	  Skin/Breast:	  Ophthalmologic:  ENMT:	  Respiratory and Thorax:	  Cardiovascular:	  Gastrointestinal:	  Genitourinary:	  Musculoskeletal:	  Neurological:	  Psychiatric:	  Hematology/Lymphatics:	  Endocrine:	  Allergic/Immunologic:	    A 10-system ROS was performed and is negative except for those items noted above and/or in the HPI.    PAST MEDICAL & SURGICAL HISTORY:    FAMILY HISTORY:    SOCIAL HISTORY:   T/E/D:   Occupation:   Lives with:     MEDICATIONS (HOME):  Home Medications:    MEDICATIONS  (STANDING):    MEDICATIONS  (PRN):    ALLERGIES/INTOLERANCES:  Allergies  No Known Allergies    Intolerances    VITALS & EXAMINATION:  Vital Signs Last 24 Hrs  T(C): 36.6 (11 Mar 2022 20:31), Max: 36.6 (11 Mar 2022 20:31)  T(F): 97.8 (11 Mar 2022 20:31), Max: 97.8 (11 Mar 2022 20:31)  HR: 88 (11 Mar 2022 20:31) (88 - 88)  BP: 187/114 (11 Mar 2022 20:31) (187/114 - 187/114)  BP(mean): --  RR: 20 (11 Mar 2022 20:31) (20 - 20)  SpO2: 100% (11 Mar 2022 20:31) (100% - 100%)  General:  Constitutional: Obese Female, appears stated age, in no apparent distress including pain  Head: Normocephalic & atraumatic.  ENT: Patent ear canals, intact TM, mucus membranes moist & pink, neck supple, no lymphadenopathy.   Respiratory: Patent airway. All lung fields are clear to auscultation bilaterally.  Extremities: No cyanosis, clubbing, or edema.  Skin: No rashes, bruising, or discoloration.    Cardiovascular (>2): RRR no murmurs. Carotid pulsations symmetric, no bruits. Normal capillary beds refill, 1-2 seconds or less.     Neurological (>12):  MS: Awake, alert, oriented to person, place, situation, time. Normal affect. Follows all commands.    Language: Speech is clear, fluent with good repetition & comprehension (able to name objects___)    CNs: PERRLA (R = 3mm, L = 3mm). VFF. EOMI no nystagmus, no diplopia. V1-3 intact to LT/pinprick, well developed masseter muscles b/l. No facial asymmetry b/l, full eye closure strength b/l. Hearing grossly normal (rubbing fingers) b/l. Symmetric palate elevation in midline. Gag reflex deferred. Head turning & shoulder shrug intact b/l. Tongue midline, normal movements, no atrophy.    Fundoscopic: pale w/ sharp discs margins No vascular changes.      Motor: Normal muscle bulk & tone. No noticeable tremor or seizure. No pronator drift.              Deltoid	Biceps	Triceps	Wrist	Finger ABd	   R	5	5	5	5	5		5 	  L	5	5	5	5	5		5    	H-Flex	H-Ext	H-ABd	H-ADd	K-Flex	K-Ext	D-Flex	P-Flex  R	5	5	5	5	5	5	5	5 	   L	5	5	5	5	5	5	5	5	     Sensation: Intact to LT/PP/Temp/Vibration/Position b/l throughout.     Cortical: Extinction on DSS (neglect): none    Reflexes:              Biceps(C5)       BR(C6)     Triceps(C7)               Patellar(L4)    Achilles(S1)    Plantar Resp  R	2	          2	             2		        2		    2		Down   L	2	          2	             2		        2		    2		Down     Coordination: intact rapid-alt movements. No dysmetria to FTN/HTS    Gait: Normal Romberg. No postural instability. Normal stance and tandem gait.     LABORATORY:      STUDIES & IMAGING:  Studies (EKG, EEG, EMG, etc):     Radiology (XR, CT, MR, U/S, TTE/MARCIAL):     HPI:  61F w/ hx of heart murmur presents for transient episode of R facial droop and word finding difficulty. LKN: 18:00 on 3/11/22 when pt reported sudden onset of lightheadedness, and generalized b/l weakness, followed by R facial heaviness and word finding difficulty lasting about 10 minutes. Pt reports trying to say "filter" but said a similar sound word and in Khmer she was trying to say "chicken leg" but said "oil" instead. Now pt reports symptoms resolved. No prior hx of stroke or seizure, no family hx of stroke, no cigarette smoking, not on antiplatelets or anticoagulants.    LKN: 18:00 on 3/11/22  NIHSS: 1  preMRS: 0   Pt is not a candidate for tpa due to mild, non-disabling deficit  Pt is not a candidate for mechanical thrombectomy due to no large vessel occlusion on CTA  ABCD2 3    REVIEW OF SYSTEMS      A 10-system ROS was performed and is negative except for those items noted above and/or in the HPI.    PAST MEDICAL & SURGICAL HISTORY:    FAMILY HISTORY:    SOCIAL HISTORY:   T/E/D:   Occupation:   Lives with:     MEDICATIONS (HOME):  Home Medications:    MEDICATIONS  (STANDING):    MEDICATIONS  (PRN):    ALLERGIES/INTOLERANCES:  Allergies  No Known Allergies    Intolerances    VITALS & EXAMINATION:  Vital Signs Last 24 Hrs  T(C): 36.6 (11 Mar 2022 20:31), Max: 36.6 (11 Mar 2022 20:31)  T(F): 97.8 (11 Mar 2022 20:31), Max: 97.8 (11 Mar 2022 20:31)  HR: 88 (11 Mar 2022 20:31) (88 - 88)  BP: 187/114 (11 Mar 2022 20:31) (187/114 - 187/114)  BP(mean): --  RR: 20 (11 Mar 2022 20:31) (20 - 20)  SpO2: 100% (11 Mar 2022 20:31) (100% - 100%)    General:  Constitutional: Female, appears stated age, in no apparent distress including pain  Head: Normocephalic & atraumatic.  Respiratory: No increased work of breathing  Extremities: No cyanosis, clubbing, or edema.  Skin: No rashes, bruising, or discoloration.    Neurological (>12):  MS: Awake, alert, oriented to person, place, situation, time. Normal affect. Follows all commands.    Language: Speech is clear, fluent with good repetition & comprehension (able to name objects thumb, mask)    CNs: PERRL (R = 3mm, L = 3mm). VFF. EOMI no nystagmus, no diplopia. V1-3 intact to LT. Minor R nasolabial fold flattening, full eye closure strength b/l. Hearing grossly normal (rubbing fingers) b/l.  Gag reflex deferred. Head turning & shoulder shrug intact b/l. Tongue midline, normal movements, no atrophy.    Motor: Normal muscle bulk. No noticeable tremor or seizure. No pronator drift.              Deltoid	Biceps	Triceps	Wrist	Finger ABd	   R	5	5	5	5			5 	  L	5	5	5	5			5    	H-Flex	K-Flex	K-Ext	D-Flex	P-Flex  R	5	5	5	5	5	  L	5	5	5	5	5	     Sensation: Intact to LT b/l throughout.     Cortical: Extinction on DSS (neglect): none    Coordination: No dysmetria to FTN    Gait: No postural instability. Normal stance and gait.     LABORATORY:  LABS:                           11.4   6.44  )-----------( 197      ( 11 Mar 2022 20:51 )             32.8       03-11    136  |  103  |  17  ----------------------------<  104<H>  3.7   |  18<L>  |  0.77    Ca    10.0      11 Mar 2022 20:51    TPro  7.2  /  Alb  4.5  /  TBili  0.3  /  DBili  x   /  AST  22  /  ALT  17  /  AlkPhos  125<H>  03-11       LIVER FUNCTIONS - ( 11 Mar 2022 20:51 )  Alb: 4.5 g/dL / Pro: 7.2 g/dL / ALK PHOS: 125 U/L / ALT: 17 U/L / AST: 22 U/L / GGT: x               PT/INR - ( 11 Mar 2022 20:51 )   PT: 11.7 sec;   INR: 1.01 ratio         PTT - ( 11 Mar 2022 20:51 )  PTT:30.5 sec    Lactate Trend      CAPILLARY BLOOD GLUCOSE  92 (11 Mar 2022 20:47)      POCT Blood Glucose.: 92 mg/dL (11 Mar 2022 20:36)      RADIOLOGY & ADDITIONAL TESTS:     STUDIES & IMAGING:  Studies (EKG, EEG, EMG, etc):     Radiology (XR, CT, MR, U/S, TTE/MARCIAL):    < from: CT Angio Neck w/ IV Cont (03.11.22 @ 21:03) >  CT ANGIOGRAPHY NECK:    Thoracic aorta and branch vessels: Patent.  No atherosclerosis.  No   flow-limiting stenosis.  No evidence of dissection.    Right carotid system: Patent.  No atherosclerosis.  No hemodynamically   significant stenosis using NASCET criteria.  No evidence of dissection.   Mild beaded appearance of the internal carotid artery.    Left carotid system: Patent.  No atherosclerosis.  No hemodynamically   significant stenosis using NASCET criteria.  No evidence of dissection.   Mild beaded appearance of the internal carotid artery.    Vertebral arteries: Patent.  No atherosclerosis.  No flow-limiting   stenosis.  No evidence of dissection.    Soft tissues of theneck: 7 mm right posterior thyroid lobe hypodense   nodule. Left thyroid lobe inferior exophytic heterogeneous nodule   measuring up to 1.0 cm, seen on thyroid ultrasound of 8/2/2018.    Visualized spine: Multilevel degenerative change.    Visualizedupper chest: Unremarkable.    CT ANGIOGRAPHY BRAIN:    Internal carotid arteries: Patent bilaterally.  No flow limiting stenosis.    Anterior cerebral arteries: Patent bilaterally without flow limiting   stenosis.    Middle cerebral arteries: Patent bilaterally without flow limiting    stenosis.    Anterior communicating artery: Not visualized.    Posterior communicating arteries: Visualized bilaterally.    Posterior cerebral arteries: Patent bilaterally without stenosis.    Vertebrobasilar: Patent without stenosis. The distal vertebral arteries   are similar in caliber.  Bilateral posterior inferior cerebellar arteries   and bilateral superior cerebellar arteries are visualized.    Vascular lesions: No evidence of intracranial aneurysm within limits of   CTA technique.  Tiny aneurysms may be beyond the resolution of this   examination.    Dural venous sinuses: Grossly patent.    IMPRESSION:    CTA Neck: No significant flow-limiting stenosis or evidence of acute   dissection within the cervical carotid or vertebral arteries. Nonspecific   mild beaded appearance to the cervical internal carotid arteries.   Fibromuscular dysplasia is a diagnostic consideration.    CTA Head: No proximal large vessel occlusion.    < end of copied text >  < from: CT Brain Stroke Protocol (03.11.22 @ 20:54) >  COMPARISON STUDY: CT head of 9/6/2013.    FINDINGS:    PARENCHYMA AND VENTRICLES: No acute intracranial hemorrhage, mass effect,   or midline shift. No hydrocephalus.  EXTRA-AXIAL: No abnormal extraaxial collection.  PARANASAL SINUSES: Within normal limits.  TYMPANOMASTOID CAVITIES: Within normal limits.  ORBITS: Within normal limits.  CALVARIUM: Within normal limits.  MISCELLANEOUS: None    IMPRESSION:  No acute intracranial hemorrhage, mass effect, or CT evidence of acute   vascular territorial infarction. If clinical symptoms persist or worsen,   more sensitive evaluation with brain MRI may be obtained, if no   contraindications exist.    < end of copied text >

## 2022-03-11 NOTE — CONSULT NOTE ADULT - ATTENDING COMMENTS
Patient was seen and examined with the Stroke fellow and PA  Exam as above was confirmed.  MRI Brain shows no acute infarct.  Rec  Can d/c home if TTE is normal  Out patient neurology follow up.

## 2022-03-11 NOTE — ED PROVIDER NOTE - PHYSICAL EXAMINATION
GENERAL: Awake, alert, NAD  HEENT: NC/AT, moist mucous membranes, PERRL, EOMI  LUNGS: CTAB, no wheezes or crackles   CARDIAC: RRR, no m/r/g  ABDOMEN: Soft, normal BS, non tender, non distended, no rebound, no guarding  BACK: No midline spinal tenderness, no CVA tenderness  EXT: No edema, no calf tenderness, 2+ DP pulses bilaterally, no deformities.  NEURO: A&Ox3. Moving all extremities. 5/5 strength UE and LE bilaterally. Sensation intact. Slight R facial droop. CN III-XII grossly intact. Normal gait. Normal cerebellar exam. Correctly identifies objects.   SKIN: Warm and dry. No rash.  PSYCH: Normal affect.

## 2022-03-11 NOTE — ED CDU PROVIDER INITIAL DAY NOTE - PHYSICAL EXAMINATION
GENERAL: Awake, alert, NAD  	HEENT: NC/AT, moist mucous membranes, PERRL, EOMI  	LUNGS: CTAB, no wheezes or crackles   	CARDIAC: RRR, no m/r/g  	ABDOMEN: Soft, normal BS, non tender, non distended, no rebound, no guarding  	BACK: No midline spinal tenderness, no CVA tenderness  	EXT: No edema, no calf tenderness, 2+ radial pulses bilaterally  	NEURO: A&Ox3. Clear speech. Moving all extremities. 5/5 strength UE and LE bilaterally. Sensation grossly intact.  No pronator drift. No facial droop.  	SKIN: Warm and dry. No visible rash.  PSYCH: Normal affect. GENERAL: Awake, alert, NAD  	HEENT: NC/AT, moist mucous membranes, PERRL, EOMI  	LUNGS: CTAB, no wheezes or crackles   	CARDIAC: RRR, no m/r/g  	ABDOMEN: Soft, normal BS, non tender, non distended, no rebound, no guarding  	EXT: No edema, no calf tenderness, 2+ radial pulses bilaterally  	NEURO: A&Ox3. Clear speech. Moving all extremities. 5/5 strength UE and LE bilaterally. Sensation grossly intact.  No pronator drift. No facial droop.  	SKIN: Warm and dry. No visible rash.  PSYCH: Normal affect.

## 2022-03-11 NOTE — ED CDU PROVIDER INITIAL DAY NOTE - NS ED ROS FT
Constitutional: No fever or chills +fatigue  Eyes: No visual changes, eye pain or redness  HEENT:  No nose bleeding.  CV: No chest pain or lower extremity edema  Resp: No SOB no cough  GI: No abd pain. +nausea No vomiting. No diarrhea. No rectal bleeding.   : No dysuria, hematuria.   MSK: No musculoskeletal pain  Skin: No rash  Psych: No complaints   Neuro: + headache. +word finding difficulty +dizziness +facial droop No weakness.  Endo: No DM

## 2022-03-11 NOTE — ED ADULT NURSE NOTE - OBJECTIVE STATEMENT
62y/o female A&Ox3 speaking coherently baseline independent code stroke @ 2036. Pt states at around 1800, she started to feel dizzy, lightheaded, and had an episode of "weird speech." States she "intended to say one word but said another," although the two words "do sound similar." Also endorses R sided mouth droop. Pt states these symptoms lasted for about 10 minutes and then resolved. Has never experienced anything like this before, no hx CVA, no family hx. Denies cigarette smoking. Also states she currently feels "weak on both sides." Denies chest pain or SOB, does not appear to be in any acute distress. Neuro intact. Proper precautions in place. All needs met. Safety and comfort measures provided. Bed locked and in lowest position, side rails up for safety. Call bell within reach.

## 2022-03-11 NOTE — ED ADULT TRIAGE NOTE - CHIEF COMPLAINT QUOTE
dizziness starting 6pm tonight, pt endorsing inability to say words properly dizziness starting 6pm tonight, pt endorsing she had one episode of inability to say words properly at 6pm. speech clear in triage.

## 2022-03-11 NOTE — ED CDU PROVIDER INITIAL DAY NOTE - PROGRESS NOTE DETAILS
I spoke with neurology and made them away of patient's bruising after Aspirin use in the past. Patient would not be due for additional Asprin/Plavix until tomorrow night, will hold until further recommendations from neurology.- Kath Chavez PA-C I spoke with neurology and made them aware of patient's bruising after Aspirin use in the past. Patient would not be due for additional Asprin/Plavix until tomorrow night, will hold until further recommendations from neurology.- Kath Chavez PA-C

## 2022-03-11 NOTE — ED PROVIDER NOTE - NS ED ROS FT
CONST: no fevers, no chills  EYES: no pain, no vision changes  ENT: no sore throat, no ear pain, no change in hearing  CV: no chest pain, no leg swelling  RESP: no shortness of breath, no cough  ABD: no abdominal pain, no nausea, no vomiting, no diarrhea  : no dysuria, no flank pain, no hematuria  MSK: no back pain, no extremity pain  NEURO: + lightheadedness, + R facial droop, + word finding difficulty  HEME: no easy bleeding  SKIN:  no rash

## 2022-03-11 NOTE — ED CDU PROVIDER INITIAL DAY NOTE - NSICDXPASTMEDICALHX_GEN_ALL_CORE_FT
PAST MEDICAL HISTORY:  No pertinent past medical history PAST MEDICAL HISTORY:  Anxiety     Heart murmur     Osteoporosis

## 2022-03-11 NOTE — CONSULT NOTE ADULT - ASSESSMENT
Plan:  [] Keep BP permissive up to 220/110  [] MRI brain without contrast  [] TTE w/ bubble  [] monitor on telemetry  [] ILR to assess for afib per STROKE-AF trial, can be done inpatient vs. outpatient  [] start ASA 81 mg PO daily   [] start plavix 75mg PO daily   [] start atorvastatin 80mg PO daily (titrate to LDL < 70)   [] stroke risk factor modification and counseling   [] check HA1c, lipid panel, Utox  [] NPO until bedside dysphagia screen  [] Patient can follow up with Dr. Papito Cormier after discharge. Please instruct the patient to call [phone] to schedule an appointment within the next 2-3 days. Office is located at [address] [place] [address].    Case discussed with stroke fellow Dr. Cleo Cintron, under supervision of attending Dr. Roger Nevarez  Case to be seen and discussed with Dr. Nevarez in AM   61F w/ hx of heart murmur presents for transient episode of R facial droop and word finding difficulty. LKN: 18:00 on 3/11/22 when pt reported sudden onset of lightheadedness, and generalized b/l weakness, followed by R facial heaviness and word finding difficulty lasting about 10 minutes. Pt reports trying to say "filter" but said a similar sounding word and in Chinese she was trying to say "chicken leg" but said "oil" instead. Now pt reports symptoms resolved. No prior hx of stroke or seizure, no family hx of stroke, no cigarette smoking, not on antiplatelets or anticoagulants. Neuro exam w/ minor R nasolabial fold flattening. CTH/CTA neg. NIHSS 1, preMRS0. No tpa or MT. ABCD2 3.    Impression: transient R facial droop and aphasia likely 2/2 TIA/minor stroke, less likely seizure    Plan:  [] Keep BP permissive up to 220/110  [] MRI brain without contrast  [] TTE w/ bubble  [] rEEG, can be done outpatient  [] monitor on telemetry  [] ILR to assess for afib per STROKE-AF trial, can be done inpatient vs. outpatient  [] start ASA 81 mg PO daily   [] start plavix 75mg PO daily for 3 weeks per [facility] protocol  [] start atorvastatin 80mg PO daily (titrate to LDL < 70)   [] stroke risk factor modification and counseling   [] check HA1c, lipid panel, Utox  [] NPO until bedside dysphagia screen  [] Given age, would send hypercoaguable panel  [] Patient can follow up with Dr. Papito Cormier after discharge. Please instruct the patient to call [phone] to schedule an appointment within the next 2-3 days. Office is located at [address] [place] [address], [place] [address].    Case discussed with stroke fellow Dr. Cleo Cintron, under supervision of attending Dr. Roger Nevarez  Case to be seen and discussed with Dr. Nevarez in AM   61F w/ hx of heart murmur presents for transient episode of R facial droop and word finding difficulty. LKN: 18:00 on 3/11/22 when pt reported sudden onset of lightheadedness, and generalized b/l weakness, followed by R facial heaviness and word finding difficulty lasting about 10 minutes. Pt reports trying to say "filter" but said a similar sounding word and in [place]ian she was trying to say "chicken leg" but said "oil" instead. Now pt reports symptoms resolved. No prior hx of stroke or seizure, no family hx of stroke, no cigarette smoking, not on antiplatelets or anticoagulants. . Neuro exam w/ minor R nasolabial fold flattening. CTH/CTA neg. NIHSS 1, preMRS0. No tpa or MT. ABCD2 3.     Impression: transient R facial droop and aphasia likely 2/2 TIA/minor stroke, less likely seizure    Plan:  [] Keep BP permissive up to 220/110 for 24-48 hrs, followed for gradual normotension  [] MRI brain without contrast  [] TTE w/ bubble  [] rEEG, can be done outpatient  [] monitor on telemetry  [] ILR to assess for afib per STROKE-AF trial, can be done inpatient vs. outpatient  [] start ASA 81 mg PO daily   [] start plavix 75mg PO daily for 3 weeks per [facility] protocol  [] start atorvastatin 80mg PO daily (titrate to LDL < 70)   [] stroke risk factor modification and counseling   [] check HA1c, lipid panel, Utox  [] NPO until bedside dysphagia screen  [] Given age, would send hypercoaguable panel  [] Patient can follow up with Dr. Papito Cormier after discharge. Please instruct the patient to call [phone] to schedule an appointment within the next 2-3 days. Office is located at [address] [place] [address], [place] [address].    Case discussed with stroke fellow Dr. Cleo Cintron, under supervision of attending Dr. Roger Nevarez  Case to be seen and discussed with Dr. Nevarez in AM

## 2022-03-11 NOTE — ED PROVIDER NOTE - CLINICAL SUMMARY MEDICAL DECISION MAKING FREE TEXT BOX
61 year old F with reported PMH or heart murmur presenting with word finding difficulty, R facial droop, lightheadedness. Patient AOx3. Slight R facial droop on exam. Otherwise non focal, NIH 1. Code stroke called, imaging negative. Unlikely acute thromboembolic stroke or hemorrhagic stroke with negative imaging. Higher suspicion for TIA. 61 year old F with reported PMH or heart murmur presenting with word finding difficulty, R facial droop, lightheadedness. Patient AOx3. Slight R facial droop on exam. Otherwise non focal, NIH 1. Code stroke called, imaging negative. Unlikely acute thromboembolic stroke or hemorrhagic stroke with negative imaging. Higher suspicion for TIA. Will start dual antiplatelet therapy, statin. CDU for MRI. 61 year old F without reported PMH or heart murmur presenting with word finding difficulty, R facial droop, lightheadedness. Patient AOx3. Slight R facial droop on exam. Otherwise non focal, NIH 1. Code stroke called, imaging negative. Unlikely acute thromboembolic stroke or hemorrhagic stroke with negative imaging. Higher suspicion for TIA. Will start dual antiplatelet therapy, statin. CDU for MRI.

## 2022-03-11 NOTE — ED CDU PROVIDER INITIAL DAY NOTE - ATTENDING CONTRIBUTION TO CARE
61F w no signif PMH here w transient word finding difficulty, lightheadedness, and slight R lower facial droop, code stroke was called from triage, pt direct to CT w neuro. NIH 1 for mild loss of nasolabial fold on R, no ataxia, normal strength, speech and sensation. CTs w/o evidence of infarct or hemorrhage. Pt to CDU for neuro checks, tele, echo, MRI.

## 2022-03-11 NOTE — ED PROVIDER NOTE - ATTENDING CONTRIBUTION TO CARE
61F w no signif PMH here w transient word finding difficulty, lightheadedness, and slight R lower facial droop, code stroke was called from triage, pt direct to CT w neuro. NIH 1 for mild loss of nasolabial fold on R, no ataxia, normal strength, speech and sensation. CTs w/o evidence of infarct or hemorrhage. Pt to CDU for neuro checks, tele, MRI. Helical Rim Advancement Flap Text: The defect edges were debeveled with a #15 blade scalpel.  Given the location of the defect and the proximity to free margins (helical rim) a double helical rim advancement flap was deemed most appropriate.  Using a sterile surgical marker, the appropriate advancement flaps were drawn incorporating the defect and placing the expected incisions between the helical rim and antihelix where possible.  The area thus outlined was incised through and through with a #15 scalpel blade.  With a skin hook and iris scissors, the flaps were gently and sharply undermined and freed up.

## 2022-03-11 NOTE — ED CDU PROVIDER INITIAL DAY NOTE - DETAILS
Facial droop  -Vitals every 4 hours, frequent reevaluations, DW ED team  -MRI, Echo, Tele, neuro checks   -Neuro following, patient given Asprin, Statin, and Plavix

## 2022-03-12 VITALS
SYSTOLIC BLOOD PRESSURE: 110 MMHG | HEART RATE: 65 BPM | TEMPERATURE: 98 F | OXYGEN SATURATION: 99 % | DIASTOLIC BLOOD PRESSURE: 70 MMHG | RESPIRATION RATE: 18 BRPM

## 2022-03-12 LAB
A1C WITH ESTIMATED AVERAGE GLUCOSE RESULT: 5.2 % — SIGNIFICANT CHANGE UP (ref 4–5.6)
CHOLEST SERPL-MCNC: 166 MG/DL — SIGNIFICANT CHANGE UP
ESTIMATED AVERAGE GLUCOSE: 103 MG/DL — SIGNIFICANT CHANGE UP (ref 68–114)
HCYS SERPL-MCNC: 12.4 UMOL/L — SIGNIFICANT CHANGE UP
HDLC SERPL-MCNC: 45 MG/DL — LOW
LIPID PNL WITH DIRECT LDL SERPL: 88 MG/DL — SIGNIFICANT CHANGE UP
NON HDL CHOLESTEROL: 121 MG/DL — SIGNIFICANT CHANGE UP
SARS-COV-2 RNA SPEC QL NAA+PROBE: SIGNIFICANT CHANGE UP
TRIGL SERPL-MCNC: 166 MG/DL — HIGH

## 2022-03-12 PROCEDURE — 85613 RUSSELL VIPER VENOM DILUTED: CPT

## 2022-03-12 PROCEDURE — U0003: CPT

## 2022-03-12 PROCEDURE — 82962 GLUCOSE BLOOD TEST: CPT

## 2022-03-12 PROCEDURE — 82947 ASSAY GLUCOSE BLOOD QUANT: CPT

## 2022-03-12 PROCEDURE — 70496 CT ANGIOGRAPHY HEAD: CPT | Mod: MA

## 2022-03-12 PROCEDURE — 85610 PROTHROMBIN TIME: CPT

## 2022-03-12 PROCEDURE — U0005: CPT

## 2022-03-12 PROCEDURE — 85307 ASSAY ACTIVATED PROTEIN C: CPT

## 2022-03-12 PROCEDURE — 85018 HEMOGLOBIN: CPT

## 2022-03-12 PROCEDURE — 70498 CT ANGIOGRAPHY NECK: CPT | Mod: MA

## 2022-03-12 PROCEDURE — 84484 ASSAY OF TROPONIN QUANT: CPT

## 2022-03-12 PROCEDURE — 84132 ASSAY OF SERUM POTASSIUM: CPT

## 2022-03-12 PROCEDURE — G0378: CPT

## 2022-03-12 PROCEDURE — G1004: CPT

## 2022-03-12 PROCEDURE — 80061 LIPID PANEL: CPT

## 2022-03-12 PROCEDURE — 85300 ANTITHROMBIN III ACTIVITY: CPT

## 2022-03-12 PROCEDURE — 36415 COLL VENOUS BLD VENIPUNCTURE: CPT

## 2022-03-12 PROCEDURE — 82435 ASSAY OF BLOOD CHLORIDE: CPT

## 2022-03-12 PROCEDURE — 99217: CPT | Mod: FS

## 2022-03-12 PROCEDURE — 36000 PLACE NEEDLE IN VEIN: CPT | Mod: XU

## 2022-03-12 PROCEDURE — 85025 COMPLETE CBC W/AUTO DIFF WBC: CPT

## 2022-03-12 PROCEDURE — 93306 TTE W/DOPPLER COMPLETE: CPT | Mod: 26

## 2022-03-12 PROCEDURE — 85014 HEMATOCRIT: CPT

## 2022-03-12 PROCEDURE — 80053 COMPREHEN METABOLIC PANEL: CPT

## 2022-03-12 PROCEDURE — 86146 BETA-2 GLYCOPROTEIN ANTIBODY: CPT

## 2022-03-12 PROCEDURE — 83036 HEMOGLOBIN GLYCOSYLATED A1C: CPT

## 2022-03-12 PROCEDURE — 85303 CLOT INHIBIT PROT C ACTIVITY: CPT

## 2022-03-12 PROCEDURE — 83605 ASSAY OF LACTIC ACID: CPT

## 2022-03-12 PROCEDURE — 82330 ASSAY OF CALCIUM: CPT

## 2022-03-12 PROCEDURE — 80307 DRUG TEST PRSMV CHEM ANLYZR: CPT

## 2022-03-12 PROCEDURE — 86147 CARDIOLIPIN ANTIBODY EA IG: CPT

## 2022-03-12 PROCEDURE — 85306 CLOT INHIBIT PROT S FREE: CPT

## 2022-03-12 PROCEDURE — 99285 EMERGENCY DEPT VISIT HI MDM: CPT | Mod: 25

## 2022-03-12 PROCEDURE — 84295 ASSAY OF SERUM SODIUM: CPT

## 2022-03-12 PROCEDURE — 82565 ASSAY OF CREATININE: CPT

## 2022-03-12 PROCEDURE — 85730 THROMBOPLASTIN TIME PARTIAL: CPT

## 2022-03-12 PROCEDURE — 83090 ASSAY OF HOMOCYSTEINE: CPT

## 2022-03-12 PROCEDURE — 93306 TTE W/DOPPLER COMPLETE: CPT

## 2022-03-12 PROCEDURE — 70450 CT HEAD/BRAIN W/O DYE: CPT | Mod: MA

## 2022-03-12 PROCEDURE — 85301 ANTITHROMBIN III ANTIGEN: CPT

## 2022-03-12 PROCEDURE — 70551 MRI BRAIN STEM W/O DYE: CPT | Mod: MG

## 2022-03-12 PROCEDURE — 70551 MRI BRAIN STEM W/O DYE: CPT | Mod: 26,MG

## 2022-03-12 PROCEDURE — 82803 BLOOD GASES ANY COMBINATION: CPT

## 2022-03-12 RX ORDER — ACETAMINOPHEN 500 MG
1000 TABLET ORAL ONCE
Refills: 0 | Status: DISCONTINUED | OUTPATIENT
Start: 2022-03-12 | End: 2022-03-12

## 2022-03-12 RX ORDER — MECLIZINE HCL 12.5 MG
25 TABLET ORAL ONCE
Refills: 0 | Status: DISCONTINUED | OUTPATIENT
Start: 2022-03-12 | End: 2022-03-15

## 2022-03-12 RX ORDER — ACETAMINOPHEN 500 MG
1000 TABLET ORAL ONCE
Refills: 0 | Status: COMPLETED | OUTPATIENT
Start: 2022-03-12 | End: 2022-03-12

## 2022-03-12 RX ORDER — SODIUM CHLORIDE 9 MG/ML
1000 INJECTION INTRAMUSCULAR; INTRAVENOUS; SUBCUTANEOUS ONCE
Refills: 0 | Status: COMPLETED | OUTPATIENT
Start: 2022-03-12 | End: 2022-03-12

## 2022-03-12 RX ORDER — SODIUM CHLORIDE 9 MG/ML
1000 INJECTION INTRAMUSCULAR; INTRAVENOUS; SUBCUTANEOUS ONCE
Refills: 0 | Status: DISCONTINUED | OUTPATIENT
Start: 2022-03-12 | End: 2022-03-12

## 2022-03-12 RX ADMIN — SODIUM CHLORIDE 500 MILLILITER(S): 9 INJECTION INTRAMUSCULAR; INTRAVENOUS; SUBCUTANEOUS at 06:11

## 2022-03-12 RX ADMIN — Medication 325 MILLIGRAM(S): at 00:09

## 2022-03-12 RX ADMIN — Medication 1000 MILLIGRAM(S): at 07:25

## 2022-03-12 RX ADMIN — Medication 1000 MILLIGRAM(S): at 06:50

## 2022-03-12 RX ADMIN — Medication 400 MILLIGRAM(S): at 06:25

## 2022-03-12 NOTE — ED ADULT NURSE REASSESSMENT NOTE - COMFORT CARE
ambulated to bathroom/darkened lights/plan of care explained/po fluids offered/repositioned/side rails up/warm blanket provided

## 2022-03-12 NOTE — ED CDU PROVIDER SUBSEQUENT DAY NOTE - PROGRESS NOTE DETAILS
LUCILA Jiménez: Patient seen at bedside in NAD.  VSS.  Patient resting comfortably without complaints. no events over tele. neuro exam intact. MRI and TTE unremarkable.  seen and cleared for discharge  by neuro and ED attending Dr. Chandler LUCILA Jiménez: neuro recommended no aspirin no plavix as pt did not have a stroke

## 2022-03-12 NOTE — ED ADULT NURSE REASSESSMENT NOTE - NS ED NURSE REASSESS COMMENT FT1
Pt. received from ED ROMARIO Mendez. Pt. A&Ox4 resting in bed comfortably with spouse at bedside. Pt. respirations even and unlabored. Pt. abdomen soft, nondistended & nontender. Pt. skin warm dry intact appropriate for ethnicity. Pt. ambulates with observed steady gait. Pt. came to the ED complaining of    Pt. remains in CDU pending MRI H/neuro checks. MRI screening formed filled out/faxed. Neuro checks q4 initiated/maintained. Pt. strength B/L U/L extremities. Sensory intact. PERRLA present. No facial droop noted. No neuro deficits noted. Pt. states she feels generalized weakness and fatigue, no pain or discomfort. Denies nausea/vomiting/abdominal pain/chest pain/pressure/SOB. R A/C IV secured and patent. Pt. NSR on cardiac monitor. Vitals remain stable - view flowsheet. Repeat labs to be drawn in the AM. Medications to be administered as ordered. Call bell within reach encouraged to call for assistance when needed. Side rails remain up for pt. safety. Will continue to monitor and reassess. Pt. received from ED ROMARIO Mendez. Pt. A&Ox4 resting in bed comfortably with spouse at bedside. Pt. respirations even and unlabored. Pt. abdomen soft, nondistended & nontender. Pt. skin warm dry intact appropriate for ethnicity. Pt. ambulates with observed steady gait. Pt. came to the ED complaining of dizziness/x1 episode of inability to use correct words when speaking. Pt. states she wanted to say one word but another word came out. Pt. states she also noted slight R facial droop. Pt. states all symptoms resolved in 10 minutes. Pt. remains in CDU pending MRI H/neuro checks. MRI screening formed filled out/faxed. Neuro checks q4 initiated/maintained. Pt. strength B/L U/L extremities. Sensory intact. PERRLA present. No facial droop noted. No neuro deficits noted. Pt. states she feels generalized weakness and fatigue, no pain or discomfort. Denies nausea/vomiting/abdominal pain/chest pain/pressure/SOB. R A/C IV secured and patent. Pt. NSR on cardiac monitor. Vitals remain stable - view flowsheet. Repeat labs to be drawn in the AM. Medications to be administered as ordered. Call bell within reach encouraged to call for assistance when needed. Side rails remain up for pt. safety. Will continue to monitor and reassess.

## 2022-03-12 NOTE — ED CDU PROVIDER DISPOSITION NOTE - NSFOLLOWUPINSTRUCTIONS_ED_ALL_ED_FT
Hydrate.     You can take Tylenol 650mg every 6-8 hours as needed for headaches.     START:   -Aspirin 81 mg daily *************************  -Plavix 75mg daily for 3 weeks *************************  -Atorvastatin 80mg daily     We recommend you follow up with your primary care provider within the next 2-3 days, please bring all of your results with you.     Please also follow up with your cardiologist or [facility]. Call [phone] to make an appointment. We are recommending that you obtain a Implantable loop recorder.    Follow up with Dr. Papito Cormier after discharge. Call [phone] to schedule an appointment within the next 2-3 days. Office is located at [address] [place] [address].  We are recommending that you obtain an EEG.    Please return to the Emergency Department with new, worsening, or concerning symptoms, such as:  -Shortness of breath or trouble breathing  -Pressure, pain, tightness in chest  -Facial drooping, arm weakness, or speech difficulty   -Head injury or loss of consciousness   -Nonstop bleeding or an open wound     *More detailed information regarding your visit and discharge can be found by reviewing this packet Hydrate.     You can take Tylenol 650mg every 6-8 hours as needed for headaches.     We recommend you follow up with your primary care provider within the next 2-3 days, please bring all of your results with you.     Please also follow up with your cardiologist or [facility]. Call [phone] to make an appointment. We are recommending that you obtain a Implantable loop recorder.    Follow up with Dr. Papito Cormier after discharge. Call [phone] to schedule an appointment within the next 2-3 days. Office is located at [address] [place] [address].  We are recommending that you obtain an EEG.    Please return to the Emergency Department with new, worsening, or concerning symptoms, such as:  -Shortness of breath or trouble breathing  -Pressure, pain, tightness in chest  -Facial drooping, arm weakness, or speech difficulty   -Head injury or loss of consciousness   -Nonstop bleeding or an open wound

## 2022-03-12 NOTE — ED CDU PROVIDER SUBSEQUENT DAY NOTE - ATTENDING CONTRIBUTION TO CARE
Attending MD Chandler:   I personally have seen and examined this patient.  Physician assistant note reviewed and agree on plan of care and except where noted.  See below for details.     Seen in CDU Remigio Chi 50    61F with PMH/PSH including heart murmur, alopecia, osteoporosis, anxiety sent to the CDU after presenting to the ED with change in speech and R facial droop.  In Emergency Department, Code stroke with no acute pathology on imaging.  In CDU, no tele events overnight, MRI and echo nonactionable, neuro recs appreciated.  Patient reports mild headache.  Denies vision changes, hearing changes.  Denies chest pain, shortness of breath, abdominal pain, nausea, vomiting, diarrhea, urinary complaints, fevers.  A ten (10) point review of systems was negative other than as stated in the HPI or elsewhere in the chart.    Exam:   General: NAD  HENT: head NCAT, airway patent, no gross facial asymmetry  Eyes: PERRL  Lungs: lungs CTAB with good inspiratory effort, no wheezing, no rhonchi, no rales  Cardiac: +S1S2, no m/r/g  GI: abdomen soft with +BS, NT  MSK: FROM at neck, no tenderness to midline palpation, no stepoffs along length of spine, no calf tenderness, swelling, erythema or warmth  Neuro: CN 2-12 grossly intact, moving all extremities spontaneously with 5/5 strength, sensory grossly intact, no gross neuro deficits  Psych: normal mood and affect     A/P: 61F with change in speech, now baseline and R facial droop, possible TIA, no acute issues at  this time.  Lab and radiology tests reviewed with patient.  Neuro recs appreciated and reviewed with patient.  Patient stable for discharge. Follow up instructions given, importance of follow up emphasized, return to ED parameters reviewed and patient verbalized understanding.  All questions answered, all concerns addressed.

## 2022-03-12 NOTE — ED CDU PROVIDER DISPOSITION NOTE - PATIENT PORTAL LINK FT
You can access the FollowMyHealth Patient Portal offered by [facility] by registering at the following website: [email or web address]. By joining [facility]’s FollowMyHealth portal, you will also be able to view your health information using other applications (apps) compatible with our system.

## 2022-03-12 NOTE — ED CDU PROVIDER SUBSEQUENT DAY NOTE - NS ED ATTENDING STATEMENT MOD
This was a shared visit with the HAYLEE. I reviewed and verified the documentation and independently performed the documented:

## 2022-03-12 NOTE — ED CDU PROVIDER DISPOSITION NOTE - CLINICAL COURSE
61 year old F with reported PMH of heart murmur, alopecia, osteoporosis, anxiety presenting with change in speech and R facial droop. States that at 6pm tonight she felt very fatigued, with an intermittent headache, dizziness, and lightheadedness. Endorsed pain to her L lower leg that lasted a few seconds. States that she felt numbness to R side of lip with facial droop. She noticed that she was mixing up words and using different words than she wanted to say. Denies CP, SOB, palpitations, syncope, history of PE or DVT.  Of note, states that she was on Aspirin 6-7 years ago for her heart murmur but stopped the medication after noticing bruising on her body.  Denies history of GI bleed.   ED course: CODE STROKE. CTs negative for acute pathology. At time of CDU evaluation, endorses fatigue, nausea, with headache. Neurology recommended Plavix, Asprin, and Atorvastatin. Plan for MRI and echo in CDU. 61 year old F with reported PMH of heart murmur, alopecia, osteoporosis, anxiety presenting with change in speech and R facial droop. States that at 6pm tonight she felt very fatigued, with an intermittent headache, dizziness, and lightheadedness. Endorsed pain to her L lower leg that lasted a few seconds. States that she felt numbness to R side of lip with facial droop. She noticed that she was mixing up words and using different words than she wanted to say. Denies CP, SOB, palpitations, syncope, history of PE or DVT.  Of note, states that she was on Aspirin 6-7 years ago for her heart murmur but stopped the medication after noticing bruising on her body.  Denies history of GI bleed.   ED course: CODE STROKE. CTs negative for acute pathology. At time of CDU evaluation, endorses fatigue, nausea, with headache. Neurology recommended Plavix, Asprin, and Atorvastatin. Plan for MRI and echo in CDU.    CDU course: no events over tele. neuro exam intact. MRI and TTE unremarkable.  seen and cleared for discharge  by neuro and ED attending Dr. Chandler

## 2022-03-12 NOTE — ED CDU PROVIDER SUBSEQUENT DAY NOTE - HISTORY
No interval changes since initial CDU provider note. Pt received Tylenol for headache. NAD VSS. Plan for MRI/Echo in the setting of aphasia/facial droop. Neuro following. - LUCILA Chavez

## 2022-03-12 NOTE — ED ADULT NURSE REASSESSMENT NOTE - NSIMPLEMENTINTERV_GEN_ALL_ED
no strength deficits were identified
Normal rate, regular rhythm.  Heart sounds S1, S2.  No murmurs, rubs or gallops.
Implemented All Fall with Harm Risk Interventions:  [place] to call system. Call bell, personal items and telephone within reach. Instruct patient to call for assistance. Room bathroom lighting operational. Non-slip footwear when patient is off stretcher. Physically safe environment: no spills, clutter or unnecessary equipment. Stretcher in lowest position, wheels locked, appropriate side rails in place. Provide visual cue, wrist band, yellow gown, etc. Monitor gait and stability. Monitor for mental status changes and reorient to person, place, and time. Review medications for side effects contributing to fall risk. Reinforce activity limits and safety measures with patient and family. Provide visual clues: red socks.

## 2022-03-14 PROBLEM — R01.1 CARDIAC MURMUR, UNSPECIFIED: Chronic | Status: ACTIVE | Noted: 2022-03-11

## 2022-03-14 PROBLEM — F41.9 ANXIETY DISORDER, UNSPECIFIED: Chronic | Status: ACTIVE | Noted: 2022-03-11

## 2022-03-14 PROBLEM — M81.0 AGE-RELATED OSTEOPOROSIS WITHOUT CURRENT PATHOLOGICAL FRACTURE: Chronic | Status: ACTIVE | Noted: 2022-03-11

## 2022-03-14 LAB
AT III ACT/NOR PPP CHRO: 66 % — LOW (ref 85–135)
AT III AG PPP IA-MCNC: 18 MG/DL — LOW (ref 19–31)
B2 GLYCOPROT1 AB SER QL: NEGATIVE — SIGNIFICANT CHANGE UP
CARDIOLIPIN AB SER-ACNC: NEGATIVE — SIGNIFICANT CHANGE UP
DRVVT SCREEN TO CONFIRM RATIO: SIGNIFICANT CHANGE UP
LA NT DPL PPP QL: 31.1 SEC — SIGNIFICANT CHANGE UP
PROT C ACT/NOR PPP: 83 % — SIGNIFICANT CHANGE UP (ref 74–150)

## 2022-03-15 LAB — APCR PPP: 2.99 RATIO — SIGNIFICANT CHANGE UP

## 2022-03-15 NOTE — ED POST DISCHARGE NOTE - DETAILS
Spoke to pt and informed of results, made aware that results indicate pt is more prone to clotting. Advised to follow up with Neuro as told in ED for continued management  - Yamilka Beltrán PA-C

## 2022-03-16 LAB — PROT S FREE PPP-ACNC: 68 % — SIGNIFICANT CHANGE UP (ref 63–140)

## 2022-03-17 ENCOUNTER — NON-APPOINTMENT (OUTPATIENT)
Age: 62
End: 2022-03-17

## 2022-03-17 ENCOUNTER — APPOINTMENT (OUTPATIENT)
Dept: OPHTHALMOLOGY | Facility: CLINIC | Age: 62
End: 2022-03-17
Payer: COMMERCIAL

## 2022-03-17 PROCEDURE — 99214 OFFICE O/P EST MOD 30 MIN: CPT

## 2022-03-17 PROCEDURE — 92133 CPTRZD OPH DX IMG PST SGM ON: CPT

## 2022-03-17 PROCEDURE — 92020 GONIOSCOPY: CPT

## 2022-04-05 ENCOUNTER — APPOINTMENT (OUTPATIENT)
Dept: GASTROENTEROLOGY | Facility: HOSPITAL | Age: 62
End: 2022-04-05

## 2022-05-02 ENCOUNTER — NON-APPOINTMENT (OUTPATIENT)
Age: 62
End: 2022-05-02

## 2022-05-03 DIAGNOSIS — Z01.812 ENCOUNTER FOR PREPROCEDURAL LABORATORY EXAMINATION: ICD-10-CM

## 2022-05-09 NOTE — ASU PATIENT PROFILE, ADULT - FALL HARM RISK - UNIVERSAL INTERVENTIONS
Bed in lowest position, wheels locked, appropriate side rails in place/Call bell, personal items and telephone in reach/Instruct patient to call for assistance before getting out of bed or chair/Non-slip footwear when patient is out of bed/[place] to call system/Physically safe environment - no spills, clutter or unnecessary equipment/Purposeful Proactive Rounding/Room/bathroom lighting operational, light cord in reach

## 2022-05-10 ENCOUNTER — OUTPATIENT (OUTPATIENT)
Dept: OUTPATIENT SERVICES | Facility: HOSPITAL | Age: 62
LOS: 1 days | Discharge: HOME | End: 2022-05-10
Payer: COMMERCIAL

## 2022-05-10 ENCOUNTER — APPOINTMENT (OUTPATIENT)
Dept: GASTROENTEROLOGY | Facility: HOSPITAL | Age: 62
End: 2022-05-10

## 2022-05-10 ENCOUNTER — NON-APPOINTMENT (OUTPATIENT)
Age: 62
End: 2022-05-10

## 2022-05-10 VITALS
SYSTOLIC BLOOD PRESSURE: 137 MMHG | DIASTOLIC BLOOD PRESSURE: 68 MMHG | HEIGHT: 60 IN | OXYGEN SATURATION: 100 % | WEIGHT: 115.96 LBS | RESPIRATION RATE: 13 BRPM | TEMPERATURE: 97 F | HEART RATE: 61 BPM

## 2022-05-10 VITALS
OXYGEN SATURATION: 100 % | DIASTOLIC BLOOD PRESSURE: 67 MMHG | SYSTOLIC BLOOD PRESSURE: 127 MMHG | RESPIRATION RATE: 20 BRPM | HEART RATE: 68 BPM

## 2022-05-10 DIAGNOSIS — Z12.11 ENCOUNTER FOR SCREENING FOR MALIGNANT NEOPLASM OF COLON: ICD-10-CM

## 2022-05-10 DIAGNOSIS — Z90.49 ACQUIRED ABSENCE OF OTHER SPECIFIED PARTS OF DIGESTIVE TRACT: Chronic | ICD-10-CM

## 2022-05-10 PROCEDURE — 45378 DIAGNOSTIC COLONOSCOPY: CPT

## 2022-07-20 NOTE — ED ADULT TRIAGE NOTE - CODE STROKE ACTIVE YN
No Yes Xeljanz Counseling: I discussed with the patient the risks of Xeljanz therapy including increased risk of infection, liver issues, headache, diarrhea, or cold symptoms. Live vaccines should be avoided. They were instructed to call if they have any problems.

## 2023-02-14 NOTE — ED ADULT NURSE NOTE - BEFAST ARM NUMBNESS
Next appt 10/18/2023  Last appt 12/30/2022    Refill request for   Disp Refills Start End    levothyroxine 50 MCG tablet 90 tablet 0 10/14/2022     Sig - Route: Take 1 tablet by mouth daily. - Oral          Refilled per standing med protocol.     No

## 2024-01-03 ENCOUNTER — APPOINTMENT (OUTPATIENT)
Dept: GASTROENTEROLOGY | Facility: CLINIC | Age: 64
End: 2024-01-03
Payer: COMMERCIAL

## 2024-01-03 VITALS
SYSTOLIC BLOOD PRESSURE: 118 MMHG | HEART RATE: 88 BPM | BODY MASS INDEX: 22.97 KG/M2 | OXYGEN SATURATION: 99 % | DIASTOLIC BLOOD PRESSURE: 72 MMHG | WEIGHT: 117 LBS | HEIGHT: 60 IN

## 2024-01-03 DIAGNOSIS — R13.12 DYSPHAGIA, OROPHARYNGEAL PHASE: ICD-10-CM

## 2024-01-03 DIAGNOSIS — R05.3 CHRONIC COUGH: ICD-10-CM

## 2024-01-03 DIAGNOSIS — R19.7 DIARRHEA, UNSPECIFIED: ICD-10-CM

## 2024-01-03 DIAGNOSIS — R10.13 EPIGASTRIC PAIN: ICD-10-CM

## 2024-01-03 PROCEDURE — 99214 OFFICE O/P EST MOD 30 MIN: CPT

## 2024-01-03 NOTE — ADDENDUM
[FreeTextEntry1] : Plan:  #1 current complaint of moderately severe chronic intermittent epigastric pain of 3 months duration was reviewed with patient.  Possible etiology, diagnostic measures and treatments discussed.  As noted, suspect PUD unlikely as she has been on a regimen of omeprazole for past several months and previously tested negative for Helicobacter pylori.  Further evaluation to assess for any possible pancreatic etiology or other pathologic process will be planned.  #2 CT scan abdomen and pelvis.  #3 upper endoscopy will be planned for evaluation of chronic epigastric pain that persists despite current regimen of omeprazole.  #4 continue current regimen of omeprazole for now.  Pending diagnostic evaluation will discuss consideration of tapering and cessation as not clear patient will warrant long-term maintenance PPI antisecretory therapy.  #5 will continue current regimen of aspirin 81 mg daily in view of history of AT3 deficiency.  Avoid NSAIDs.  #6 laboratory assessment submitted by PMD of 10/2023 was reviewed with patient.  Therefore, follow-up labs not submitted at this office revisit appointment.  #7 lactose-free diet.  Avoid dairy or other triggers that provoke diarrhea.  #8 report of post deglutitive cough discussed.  Question as to etiology related to issues of sinusitis and postnasal drip discussed.  Pending clinical course may warrant speech and swallow evaluation with modified barium swallow.  #9 screening colonoscopy advised for 5/2032.

## 2024-01-03 NOTE — REASON FOR VISIT
[Follow-up] : a follow-up of an existing diagnosis [FreeTextEntry1] : for evaluation of abdominal pain and intermittent diarrhea.

## 2024-01-03 NOTE — ASSESSMENT
[FreeTextEntry1] : Impression:  #1 epigastric abdominal pain-approximate 3-month history of a primarily epigastric pain radiating to right upper quadrant with occasional periumbilical abdominal pain.  Doubt PUD-persists despite omeprazole and tested negative for Helicobacter pylori (although on aspirin 81 mg denies other NSAIDs).  Less likely biliary-status postcholecystectomy.  Assess for possible pancreas etiology or any possibility of neoplastic process.  Possibly functional-has had episodic similar symptoms in the past last reported in approximately 2019 with nondiagnostic upper endoscopy and imaging at that time.  #2 colon cancer screening- patient average risk.  Colonoscopy 5/10/2022 was normal.  #3 diarrhea-lactose intolerance and dairy sensitive. Occasional loose stools after dairy products.  #4 post deglutitive cough-possibly related to issues of sinusitis and postnasal drip.  #5 status post cholecystectomy.  #6 hepatic hemangioma.  #7 history of alopecia.  #8 history chronic cough-reports better after omeprazole.  Question as to whether this was GERD mediated versus related to issues of sinusitis and PND.  #9 history of sinusitis and postnasal drip.

## 2024-01-03 NOTE — HISTORY OF PRESENT ILLNESS
[FreeTextEntry1] : Office revisit on 1/3/2024.  Patient presents for GI followup regarding chronic epigastric pain.  INITIAL CONSULTATION NOTE:  Office revisit on 6/24/15. The patient is a 54-year-old woman with a complaint of right upper quadrant pain. The patient was recently evaluated by Dr. Afshin Del Valle. The patient requests followup by me as her  and other family members are my patients.  The patient has multiple somatic complaints. Salient history:  -Constant pain localized to right costal margin region x 3months. Described as a dull, heaviness and pressure discomfort but also can be sharp. Severity increased postprandial. Feels pain is worse immediately upon eating and implicates no particular foods are provocative. At times the pain can radiate around the flank to her back or occasionally to the right side of the abdomen.  -Complaints of heartburn. Typical retrosternal burning. Recently started on pantoprazole 40 mg daily with improvement in heartburn. Right upper quadrant discomfort is no better.  -Burning discomfort of tongue. No better on pantoprazole.  -Dysuria of several weeks duration.  -Complains of insomnia. Difficulty sleeping because of discomfort.  -Complains of dizziness.  Patient relates onset of various current symptoms to a dental implant was inserted approximately 3 months ago. Was on antibiotics for approximately one month at that time.  Stable appetite. Has lost 4 pounds. Denies dysphagia, regurgitation, nausea or vomiting. Previously constipated. Now has regular daily bowel movements without complaints of diarrhea or any rectal bleeding.  A CT scan and abdominal ultrasound was reported as nondiagnostic. Patient was told of hepatic steatosis.  She underwent a laparoscopic cholecystectomy approximately 15 years ago.  EGD 8/10/15:    -The patient underwent an upper endoscopy on 8/10/15. The esophagus was normal. Examination of the stomach revealed prepyloric antral erythema. The gastric examination was otherwise normal. The visualized duodenum to the second portion was normal. Duodenal biopsies were normal without any features of celiac disease. Gastric biopsy revealed chronic inactive gastritis and reactive gastropathy. Testing was negative for Helicobacter pylori.   ORV 1/5/18;    -Patient presents with complaint of epigastric and right upper quadrant pain. Current complaint similar to previous symptoms reported in 2015 but patient indicates pain is more severe at this time. Previous symptoms in 2015 were of unexplained etiology and ultimately resolved. However, patient indicates unexplained recurrence one month ago with increased severity over the past 2 weeks. No precipitating factors over this past month are reported such as diet change, new medications, travel etc. Has been under increased stress.             -Now experiencing a constant epigastric and right upper quadrant pain that increases in severity in association with eating. Patient indicates that increased severity of pain is almost immediate upon beginning the meal and that no particular food is provocative. Describes a sharp pain at the epigastric, right upper quadrant as well as right flank and back region in association with eating. The more intense pain will subside but there is a persistent constant dull pain at the site rated at 4/10. No relationship to exertion, lifting etc. Palliative factors not reported. The patient has been on omeprazole 40 mg b.i.d. without improvement. No associated rash.                 -Denies fever. Appetite fair. No significant weight change but may have lost one or 2 pounds. Denies dysphagia, nausea or vomiting. Can have occasional heartburn. Typically has a bowel movement every other day without complaints of diarrhea, constipation or rectal bleeding.                 -Patient had recent routine labs including CBC, liver enzymes and amylase which were all normal except for mildly elevated amylase.                  -Patient complains of burning sensation in her mouth as well as vagina.  CT SCAN ABDOMEN 2/1/18:    -Review of the 2/1/18 CT scan of the abdomen with the patient. Examination of the liver was noted for an 11 mm enhancing nodule in the dome of the right hepatic lobe. Of note, an identical finding was detected at a previous CAT scan on 5/5/15 at which time an 11 mm enhancing right hepatic nodule was noted consistent with a hemangioma. Stable findings. CT scan otherwise normal including normal pancreas, kidneys and visualized bowel. No significant findings noted.  ORV 6/7/19:    -Experiencing intermittent right upper quadrant abdominal pain. Mostly right upper quadrant and especially right costal margin region. Described as intermittent nonradiating sharp pain lasting one hour. Usually occurs post prandial with episodes 2-3 times per week. Feels dairy products exacerbate symptoms. Patient is status post cholecystectomy.                       -Experiencing a regular bowel movements for several months.. Indicates diarrhea after dairy products. Typically having 2-3 bowel movements daily which vary in consistency. Describes soft bowel movements or at times passage of "small pieces". No rectal bleeding. Previously was having formed bowel movement every other day. Precipitating factors at onset such as travel, diet change etc. not reported.                      -Appetite stable. Indicates gradual 7 pound weight decrease. Denies dysphagia. Only infrequently experiences heartburn. Denies nausea or vomiting.  ORV 2/23/2022:     -Presents for follow-up in preparation for screening colonoscopy.  Overall, doing well from GI standpoint.  Somewhat irregular bowel movements but stable.  Typically has formed Oaks 3 or Oaks 4 bowel movements every other day.  Occasional loose stools reported and describes her self as being dairy sensitive.  Denies change in bowel habit or rectal bleeding.  Denies fever.  Appetite and weight are stable reports no current complaints of dysphagia, nausea, vomiting or abdominal pain.                               -In 10/2021 had experienced epigastric pain.  Indicates PMD submitted routine labs which were normal (will request).  Patient is status post laparoscopic cholecystectomy.  Treated with omeprazole at that time with resolution of symptoms.  No current complaint of abdominal pain.  COLONOSCOPY 5/10/2022:     -Screening colonoscopy was performed on 5/10/2022. Patient average risk. Total colonoscopy was performed to the cecum with ileoscopy. Normal terminal ileum. Normal colonoscopy examination. Follow-up screening colonoscopy advised in 10 years.  ORV 1/3/2024:     -Evaluated regarding chronic intermittent epigastric pain with onset 10/2023.  Of note, patient indicates that for about the past year she has been experiencing symptoms attributed to her sinuses with associated postnasal drip.  Throughout the year has had different courses of antibiotics and nasal sprays.  Sinus symptoms improved at current time.  In addition, had also been experiencing a cough prompting  to prescribe regimen of omeprazole which she has been on for the last several months.  Cough significantly improved.  Of note, had not been experiencing complaints of heartburn, regurgitation or other GERD symptoms at that time.                            -Past 3 months has been experiencing daily episodes of a sharp primarily epigastric pain that can radiate along the right upper quadrant.  Can also be periumbilical but mostly epigastric.  Described as sharp and lasts hours.  Precipitating factors not reported.  No clear relation to eating or particular food.  Questionably better after drinking water or current regimen of omeprazole but minimally so.  Otherwise, no significant palliative factors reported.  Episodes occur daily and lasts hours.  As noted, no resolution of pain despite current regimen of omeprazole of several months' duration.  Current epigastric and right upper quadrant pain is different from her biliary pain noted at time of prior laparoscopic cholecystectomy.                            -Denies fever.  Appetite and weight are stable.  Can experience burning discomfort of her tongue.  Past several months experiencing a post deglutitive cough after drinking liquids.  No dysphagia to solids.  Denies complaints of heartburn, nausea or vomiting.  As noted, current dominant GI complaint is an intermittent primarily sharp epigastric pain radiating to right upper quadrant or at times at periumbilical region.  Experiences bloating of mid and upper abdomen but not abdominal distention.  Mostly having daily formed Oaks 4 or Oaks 5 bowel movements.  Approximately twice per week may have 1-2 episodes of nonbloody diarrhea.  Indicates that dairy products can be provocative of diarrhea.                            -Laboratory assessment 10/2023 submitted by PMD: CBC-WBC 5500, hemoglobin 14.2, hematocrit 42.1.  TSH 1.31.  Helicobacter pylori antigen negative.  CMP: Creatinine 0.7.  Liver enzymes normal including ALT 35 except for mildly elevated alkaline phosphatase 155.  Alkaline phosphatase isoenzymes-normal distribution.  CT sinuses 11/1/2023-inflammation paranasal sinuses.  Pelvic ultrasound 11/15/2023-uterine fibroid.                            -Has history of AT3 deficiency.  Utilizes aspirin 81 mg.  Denies other NSAIDs.                            -Medications: Nasal sprays, aspirin 81 mg, escitalopram, omeprazole, vitamin D, B12.

## 2024-01-03 NOTE — PHYSICAL EXAM
[Alert] : alert [Normal Voice/Communication] : normal voice/communication [Healthy Appearing] : healthy appearing [No Acute Distress] : no acute distress [Sclera] : the sclera and conjunctiva were normal [Normal Appearance] : the appearance of the neck was normal [No Neck Mass] : no neck mass was observed [No Respiratory Distress] : no respiratory distress [No Acc Muscle Use] : no accessory muscle use [Respiration, Rhythm And Depth] : normal respiratory rhythm and effort [Auscultation Breath Sounds / Voice Sounds] : lungs were clear to auscultation bilaterally [Heart Rate And Rhythm] : heart rate was normal and rhythm regular [Normal S1, S2] : normal S1 and S2 [Murmurs] : no murmurs [None] : no edema [Bowel Sounds] : normal bowel sounds [No Masses] : no abdominal mass palpated [Abdomen Soft] : soft [] : no hepatosplenomegaly [Rebound Tenderness] : no rebound tenderness [Epigastric] : epigastric [Cervical Lymph Nodes Enlarged Posterior Bilaterally] : no posterior cervical lymphadenopathy [Supraclavicular Lymph Nodes Enlarged Bilaterally] : no supraclavicular lymphadenopathy [Cervical Lymph Nodes Enlarged Anterior Bilaterally] : no anterior cervical lymphadenopathy [Abnormal Walk] : normal gait [No Clubbing, Cyanosis] : no clubbing or cyanosis of the fingernails [Normal Color / Pigmentation] : normal skin color and pigmentation [Oriented To Time, Place, And Person] : oriented to person, place, and time [Normal Affect] : the affect was normal [Normal Insight/Judgment] : insight and judgment were intact [Normal Mood] : the mood was normal [de-identified] : Normal bowel sounds.  Upper abdominal tenderness elicited-primarily epigastric and also left upper quadrant.  No rebound or guarding.  Abdomen is soft, nondistended and without mass or hepatosplenomegaly.

## 2024-01-31 ENCOUNTER — NON-APPOINTMENT (OUTPATIENT)
Age: 64
End: 2024-01-31

## 2024-02-13 ENCOUNTER — APPOINTMENT (OUTPATIENT)
Dept: GASTROENTEROLOGY | Facility: HOSPITAL | Age: 64
End: 2024-02-13

## 2024-03-04 ENCOUNTER — NON-APPOINTMENT (OUTPATIENT)
Age: 64
End: 2024-03-04

## 2024-03-05 ENCOUNTER — RESULT REVIEW (OUTPATIENT)
Age: 64
End: 2024-03-05

## 2024-03-05 ENCOUNTER — OUTPATIENT (OUTPATIENT)
Dept: OUTPATIENT SERVICES | Facility: HOSPITAL | Age: 64
LOS: 1 days | Discharge: HOME | End: 2024-03-05
Payer: COMMERCIAL

## 2024-03-05 ENCOUNTER — APPOINTMENT (OUTPATIENT)
Dept: GASTROENTEROLOGY | Facility: HOSPITAL | Age: 64
End: 2024-03-05

## 2024-03-05 VITALS
SYSTOLIC BLOOD PRESSURE: 121 MMHG | HEART RATE: 67 BPM | OXYGEN SATURATION: 100 % | RESPIRATION RATE: 12 BRPM | DIASTOLIC BLOOD PRESSURE: 70 MMHG

## 2024-03-05 VITALS
TEMPERATURE: 97 F | RESPIRATION RATE: 16 BRPM | WEIGHT: 117.07 LBS | HEART RATE: 60 BPM | SYSTOLIC BLOOD PRESSURE: 119 MMHG | HEIGHT: 60 IN | OXYGEN SATURATION: 100 % | DIASTOLIC BLOOD PRESSURE: 84 MMHG

## 2024-03-05 DIAGNOSIS — R10.13 EPIGASTRIC PAIN: ICD-10-CM

## 2024-03-05 DIAGNOSIS — Z90.49 ACQUIRED ABSENCE OF OTHER SPECIFIED PARTS OF DIGESTIVE TRACT: Chronic | ICD-10-CM

## 2024-03-05 DIAGNOSIS — K25.7 CHRONIC GASTRIC ULCER W/OUT HEMORRHAGE OR PERFORATION: ICD-10-CM

## 2024-03-05 DIAGNOSIS — Z00.00 ENCOUNTER FOR GENERAL ADULT MEDICAL EXAMINATION WITHOUT ABNORMAL FINDINGS: ICD-10-CM

## 2024-03-05 PROCEDURE — 88305 TISSUE EXAM BY PATHOLOGIST: CPT | Mod: 26

## 2024-03-05 PROCEDURE — 43239 EGD BIOPSY SINGLE/MULTIPLE: CPT

## 2024-03-05 RX ORDER — OMEPRAZOLE 40 MG/1
40 CAPSULE, DELAYED RELEASE ORAL
Qty: 90 | Refills: 1 | Status: ACTIVE | COMMUNITY
Start: 2024-03-05 | End: 1900-01-01

## 2024-03-11 ENCOUNTER — NON-APPOINTMENT (OUTPATIENT)
Age: 64
End: 2024-03-11

## 2024-03-11 DIAGNOSIS — K25.9 GASTRIC ULCER, UNSPECIFIED AS ACUTE OR CHRONIC, W/OUT HEMORRHAGE OR PERFORATION: ICD-10-CM

## 2024-03-11 LAB — SURGICAL PATHOLOGY STUDY: SIGNIFICANT CHANGE UP

## 2024-03-11 RX ORDER — RABEPRAZOLE SODIUM 20 MG/1
20 TABLET, DELAYED RELEASE ORAL
Qty: 90 | Refills: 0 | Status: ACTIVE | COMMUNITY
Start: 2024-03-11 | End: 1900-01-01

## 2024-06-18 ENCOUNTER — APPOINTMENT (OUTPATIENT)
Dept: GASTROENTEROLOGY | Facility: HOSPITAL | Age: 64
End: 2024-06-18

## 2024-09-26 ENCOUNTER — NON-APPOINTMENT (OUTPATIENT)
Age: 64
End: 2024-09-26

## 2024-09-26 ENCOUNTER — APPOINTMENT (OUTPATIENT)
Dept: OPHTHALMOLOGY | Facility: CLINIC | Age: 64
End: 2024-09-26
Payer: COMMERCIAL

## 2024-09-26 PROCEDURE — 92014 COMPRE OPH EXAM EST PT 1/>: CPT

## 2024-09-26 PROCEDURE — 92020 GONIOSCOPY: CPT

## 2024-11-20 ENCOUNTER — APPOINTMENT (OUTPATIENT)
Dept: OBGYN | Facility: CLINIC | Age: 64
End: 2024-11-20
Payer: COMMERCIAL

## 2024-11-20 PROCEDURE — 99386 PREV VISIT NEW AGE 40-64: CPT

## 2024-11-20 PROCEDURE — 99459 PELVIC EXAMINATION: CPT | Mod: NC

## 2024-11-20 PROCEDURE — 96127 BRIEF EMOTIONAL/BEHAV ASSMT: CPT

## 2024-12-04 ENCOUNTER — APPOINTMENT (OUTPATIENT)
Dept: OBGYN | Facility: CLINIC | Age: 64
End: 2024-12-04
Payer: COMMERCIAL

## 2024-12-04 PROCEDURE — 76830 TRANSVAGINAL US NON-OB: CPT

## 2024-12-04 PROCEDURE — 99212 OFFICE O/P EST SF 10 MIN: CPT | Mod: 25

## 2024-12-04 PROCEDURE — 76856 US EXAM PELVIC COMPLETE: CPT

## 2024-12-10 ENCOUNTER — OUTPATIENT (OUTPATIENT)
Dept: OUTPATIENT SERVICES | Facility: HOSPITAL | Age: 64
LOS: 1 days | End: 2024-12-10
Payer: COMMERCIAL

## 2024-12-10 ENCOUNTER — APPOINTMENT (OUTPATIENT)
Dept: ULTRASOUND IMAGING | Facility: IMAGING CENTER | Age: 64
End: 2024-12-10
Payer: COMMERCIAL

## 2024-12-10 ENCOUNTER — APPOINTMENT (OUTPATIENT)
Dept: MAMMOGRAPHY | Facility: IMAGING CENTER | Age: 64
End: 2024-12-10
Payer: COMMERCIAL

## 2024-12-10 DIAGNOSIS — Z00.8 ENCOUNTER FOR OTHER GENERAL EXAMINATION: ICD-10-CM

## 2024-12-10 DIAGNOSIS — Z90.49 ACQUIRED ABSENCE OF OTHER SPECIFIED PARTS OF DIGESTIVE TRACT: Chronic | ICD-10-CM

## 2024-12-10 PROCEDURE — 77063 BREAST TOMOSYNTHESIS BI: CPT | Mod: 26

## 2024-12-10 PROCEDURE — 76641 ULTRASOUND BREAST COMPLETE: CPT | Mod: 26,50

## 2024-12-10 PROCEDURE — 77067 SCR MAMMO BI INCL CAD: CPT | Mod: 26

## 2024-12-10 PROCEDURE — 76641 ULTRASOUND BREAST COMPLETE: CPT

## 2024-12-10 PROCEDURE — 77067 SCR MAMMO BI INCL CAD: CPT

## 2024-12-10 PROCEDURE — 77063 BREAST TOMOSYNTHESIS BI: CPT

## 2025-04-02 ENCOUNTER — NON-APPOINTMENT (OUTPATIENT)
Age: 65
End: 2025-04-02

## 2025-05-19 ENCOUNTER — NON-APPOINTMENT (OUTPATIENT)
Age: 65
End: 2025-05-19

## 2025-05-21 ENCOUNTER — APPOINTMENT (OUTPATIENT)
Dept: GASTROENTEROLOGY | Facility: CLINIC | Age: 65
End: 2025-05-21
Payer: COMMERCIAL

## 2025-05-21 VITALS
RESPIRATION RATE: 16 BRPM | WEIGHT: 117 LBS | SYSTOLIC BLOOD PRESSURE: 115 MMHG | DIASTOLIC BLOOD PRESSURE: 55 MMHG | BODY MASS INDEX: 22.97 KG/M2 | OXYGEN SATURATION: 99 % | HEART RATE: 88 BPM | HEIGHT: 60 IN

## 2025-05-21 DIAGNOSIS — R10.11 RIGHT UPPER QUADRANT PAIN: ICD-10-CM

## 2025-05-21 DIAGNOSIS — M94.0 CHONDROCOSTAL JUNCTION SYNDROME [TIETZE]: ICD-10-CM

## 2025-05-21 DIAGNOSIS — R10.13 EPIGASTRIC PAIN: ICD-10-CM

## 2025-05-21 PROCEDURE — 99214 OFFICE O/P EST MOD 30 MIN: CPT

## 2025-05-21 RX ORDER — RABEPRAZOLE SODIUM 20 MG/1
20 TABLET, DELAYED RELEASE ORAL
Qty: 30 | Refills: 3 | Status: ACTIVE | COMMUNITY
Start: 2025-05-21 | End: 1900-01-01

## 2025-06-20 ENCOUNTER — OUTPATIENT (OUTPATIENT)
Dept: OUTPATIENT SERVICES | Facility: HOSPITAL | Age: 65
LOS: 1 days | End: 2025-06-20
Payer: COMMERCIAL

## 2025-06-20 ENCOUNTER — RESULT REVIEW (OUTPATIENT)
Age: 65
End: 2025-06-20

## 2025-06-20 ENCOUNTER — APPOINTMENT (OUTPATIENT)
Dept: GASTROENTEROLOGY | Facility: HOSPITAL | Age: 65
End: 2025-06-20

## 2025-06-20 VITALS
HEIGHT: 60 IN | SYSTOLIC BLOOD PRESSURE: 120 MMHG | DIASTOLIC BLOOD PRESSURE: 72 MMHG | HEART RATE: 60 BPM | WEIGHT: 117.07 LBS | TEMPERATURE: 98 F | OXYGEN SATURATION: 100 %

## 2025-06-20 VITALS
RESPIRATION RATE: 18 BRPM | SYSTOLIC BLOOD PRESSURE: 133 MMHG | HEART RATE: 72 BPM | DIASTOLIC BLOOD PRESSURE: 70 MMHG | OXYGEN SATURATION: 100 %

## 2025-06-20 DIAGNOSIS — Z90.49 ACQUIRED ABSENCE OF OTHER SPECIFIED PARTS OF DIGESTIVE TRACT: Chronic | ICD-10-CM

## 2025-06-20 DIAGNOSIS — R10.13 EPIGASTRIC PAIN: ICD-10-CM

## 2025-06-20 PROCEDURE — 43239 EGD BIOPSY SINGLE/MULTIPLE: CPT

## 2025-06-20 PROCEDURE — 88305 TISSUE EXAM BY PATHOLOGIST: CPT | Mod: 26

## 2025-06-20 RX ORDER — SODIUM CHLORIDE 9 G/1000ML
1000 INJECTION, SOLUTION INTRAVENOUS
Refills: 0 | Status: ACTIVE | OUTPATIENT
Start: 2025-06-20

## 2025-06-24 ENCOUNTER — NON-APPOINTMENT (OUTPATIENT)
Age: 65
End: 2025-06-24

## 2025-06-24 LAB — SURGICAL PATHOLOGY STUDY: SIGNIFICANT CHANGE UP

## 2025-08-20 ENCOUNTER — NON-APPOINTMENT (OUTPATIENT)
Age: 65
End: 2025-08-20

## (undated) DEVICE — LUBRICATING JELLY HR ONE SHOT 3G

## (undated) DEVICE — DRSG 2X2

## (undated) DEVICE — UNDERPAD LINEN SAVER 17 X 24"

## (undated) DEVICE — GOWN LG

## (undated) DEVICE — CONTAINER FORMALIN 80ML YELLOW

## (undated) DEVICE — BIOPSY FORCEP RADIAL JAW 4 STANDARD WITH NEEDLE

## (undated) DEVICE — LINE BREATHE SAMPLNG

## (undated) DEVICE — CATH IV SAFE BC 22G X 1" (BLUE)

## (undated) DEVICE — DRSG BANDAID 0.75X3"

## (undated) DEVICE — ELCTR ECG CONDUCTIVE ADHESIVE

## (undated) DEVICE — CLAMP BX HOT RAD JAW 3

## (undated) DEVICE — DRSG CURITY GAUZE SPONGE 4 X 4" 12-PLY NON-STERILE

## (undated) DEVICE — BITE BLOCK ADULT 20 X 27MM (GREEN)

## (undated) DEVICE — TUBING IV SET GRAVITY 3Y 100" MACRO

## (undated) DEVICE — BASIN EMESIS 10IN GRADUATED MAUVE

## (undated) DEVICE — BIOPSY FORCEP COLD DISP

## (undated) DEVICE — FACESHIELD FULL VISOR

## (undated) DEVICE — PACK IV START WITH CHG

## (undated) DEVICE — SALIVA EJECTOR (BLUE)

## (undated) DEVICE — TUBING MEDI-VAC W MAXIGRIP CONNECTORS 1/4"X6'

## (undated) DEVICE — CONTAINER FORMALIN 10% 20ML

## (undated) DEVICE — TUBING SUCTION NONCONDUCTIVE 6MM X 12FT

## (undated) DEVICE — ELCTR GROUNDING PAD ADULT COVIDIEN

## (undated) DEVICE — DENTURE CUP PINK